# Patient Record
Sex: MALE | Race: WHITE | NOT HISPANIC OR LATINO | Employment: FULL TIME | URBAN - METROPOLITAN AREA
[De-identification: names, ages, dates, MRNs, and addresses within clinical notes are randomized per-mention and may not be internally consistent; named-entity substitution may affect disease eponyms.]

---

## 2022-02-24 ENCOUNTER — LAB VISIT (OUTPATIENT)
Dept: LAB | Facility: HOSPITAL | Age: 52
End: 2022-02-24
Attending: INTERNAL MEDICINE
Payer: COMMERCIAL

## 2022-02-24 ENCOUNTER — OFFICE VISIT (OUTPATIENT)
Dept: PRIMARY CARE CLINIC | Facility: CLINIC | Age: 52
End: 2022-02-24
Payer: COMMERCIAL

## 2022-02-24 VITALS
HEIGHT: 72 IN | SYSTOLIC BLOOD PRESSURE: 122 MMHG | OXYGEN SATURATION: 97 % | BODY MASS INDEX: 25.47 KG/M2 | WEIGHT: 188.06 LBS | RESPIRATION RATE: 16 BRPM | TEMPERATURE: 98 F | HEART RATE: 67 BPM | DIASTOLIC BLOOD PRESSURE: 78 MMHG

## 2022-02-24 DIAGNOSIS — Z12.5 PROSTATE CANCER SCREENING: ICD-10-CM

## 2022-02-24 DIAGNOSIS — Z11.4 ENCOUNTER FOR SCREENING FOR HIV: ICD-10-CM

## 2022-02-24 DIAGNOSIS — I10 HYPERTENSION, UNSPECIFIED TYPE: ICD-10-CM

## 2022-02-24 DIAGNOSIS — Z11.59 NEED FOR HEPATITIS C SCREENING TEST: ICD-10-CM

## 2022-02-24 DIAGNOSIS — Z00.00 WELL ADULT EXAM: ICD-10-CM

## 2022-02-24 DIAGNOSIS — Z11.4 ENCOUNTER FOR SCREENING FOR HIV: Primary | ICD-10-CM

## 2022-02-24 DIAGNOSIS — Z12.11 COLON CANCER SCREENING: ICD-10-CM

## 2022-02-24 LAB
ALBUMIN SERPL BCP-MCNC: 4.6 G/DL (ref 3.5–5.2)
ALP SERPL-CCNC: 54 U/L (ref 55–135)
ALT SERPL W/O P-5'-P-CCNC: 33 U/L (ref 10–44)
ANION GAP SERPL CALC-SCNC: 10 MMOL/L (ref 8–16)
AST SERPL-CCNC: 27 U/L (ref 10–40)
BASOPHILS # BLD AUTO: 0.04 K/UL (ref 0–0.2)
BASOPHILS NFR BLD: 0.9 % (ref 0–1.9)
BILIRUB SERPL-MCNC: 0.7 MG/DL (ref 0.1–1)
BUN SERPL-MCNC: 13 MG/DL (ref 6–20)
CALCIUM SERPL-MCNC: 10.5 MG/DL (ref 8.7–10.5)
CHLORIDE SERPL-SCNC: 100 MMOL/L (ref 95–110)
CHOLEST SERPL-MCNC: 236 MG/DL (ref 120–199)
CHOLEST/HDLC SERPL: 3.1 {RATIO} (ref 2–5)
CO2 SERPL-SCNC: 31 MMOL/L (ref 23–29)
COMPLEXED PSA SERPL-MCNC: 1.2 NG/ML (ref 0–4)
CREAT SERPL-MCNC: 0.8 MG/DL (ref 0.5–1.4)
DIFFERENTIAL METHOD: ABNORMAL
EOSINOPHIL # BLD AUTO: 0.2 K/UL (ref 0–0.5)
EOSINOPHIL NFR BLD: 4 % (ref 0–8)
ERYTHROCYTE [DISTWIDTH] IN BLOOD BY AUTOMATED COUNT: 11.9 % (ref 11.5–14.5)
EST. GFR  (AFRICAN AMERICAN): >60 ML/MIN/1.73 M^2
EST. GFR  (NON AFRICAN AMERICAN): >60 ML/MIN/1.73 M^2
GLUCOSE SERPL-MCNC: 112 MG/DL (ref 70–110)
HCT VFR BLD AUTO: 47.5 % (ref 40–54)
HDLC SERPL-MCNC: 77 MG/DL (ref 40–75)
HDLC SERPL: 32.6 % (ref 20–50)
HGB BLD-MCNC: 16 G/DL (ref 14–18)
IMM GRANULOCYTES # BLD AUTO: 0.01 K/UL (ref 0–0.04)
IMM GRANULOCYTES NFR BLD AUTO: 0.2 % (ref 0–0.5)
LDLC SERPL CALC-MCNC: 143 MG/DL (ref 63–159)
LYMPHOCYTES # BLD AUTO: 1.1 K/UL (ref 1–4.8)
LYMPHOCYTES NFR BLD: 24.7 % (ref 18–48)
MCH RBC QN AUTO: 31.4 PG (ref 27–31)
MCHC RBC AUTO-ENTMCNC: 33.7 G/DL (ref 32–36)
MCV RBC AUTO: 93 FL (ref 82–98)
MONOCYTES # BLD AUTO: 0.4 K/UL (ref 0.3–1)
MONOCYTES NFR BLD: 9.1 % (ref 4–15)
NEUTROPHILS # BLD AUTO: 2.6 K/UL (ref 1.8–7.7)
NEUTROPHILS NFR BLD: 61.1 % (ref 38–73)
NONHDLC SERPL-MCNC: 159 MG/DL
NRBC BLD-RTO: 0 /100 WBC
PLATELET # BLD AUTO: 251 K/UL (ref 150–450)
PMV BLD AUTO: 9.7 FL (ref 9.2–12.9)
POTASSIUM SERPL-SCNC: 4.5 MMOL/L (ref 3.5–5.1)
PROT SERPL-MCNC: 7.9 G/DL (ref 6–8.4)
RBC # BLD AUTO: 5.1 M/UL (ref 4.6–6.2)
SODIUM SERPL-SCNC: 141 MMOL/L (ref 136–145)
TRIGL SERPL-MCNC: 80 MG/DL (ref 30–150)
WBC # BLD AUTO: 4.29 K/UL (ref 3.9–12.7)

## 2022-02-24 PROCEDURE — 1159F MED LIST DOCD IN RCRD: CPT | Mod: CPTII,S$GLB,, | Performed by: INTERNAL MEDICINE

## 2022-02-24 PROCEDURE — 3074F PR MOST RECENT SYSTOLIC BLOOD PRESSURE < 130 MM HG: ICD-10-PCS | Mod: CPTII,S$GLB,, | Performed by: INTERNAL MEDICINE

## 2022-02-24 PROCEDURE — 3008F PR BODY MASS INDEX (BMI) DOCUMENTED: ICD-10-PCS | Mod: CPTII,S$GLB,, | Performed by: INTERNAL MEDICINE

## 2022-02-24 PROCEDURE — 86803 HEPATITIS C AB TEST: CPT | Performed by: INTERNAL MEDICINE

## 2022-02-24 PROCEDURE — 84153 ASSAY OF PSA TOTAL: CPT | Performed by: INTERNAL MEDICINE

## 2022-02-24 PROCEDURE — 99386 PREV VISIT NEW AGE 40-64: CPT | Mod: S$GLB,,, | Performed by: INTERNAL MEDICINE

## 2022-02-24 PROCEDURE — 3078F DIAST BP <80 MM HG: CPT | Mod: CPTII,S$GLB,, | Performed by: INTERNAL MEDICINE

## 2022-02-24 PROCEDURE — 3074F SYST BP LT 130 MM HG: CPT | Mod: CPTII,S$GLB,, | Performed by: INTERNAL MEDICINE

## 2022-02-24 PROCEDURE — 99386 PR PREVENTIVE VISIT,NEW,40-64: ICD-10-PCS | Mod: S$GLB,,, | Performed by: INTERNAL MEDICINE

## 2022-02-24 PROCEDURE — 87389 HIV-1 AG W/HIV-1&-2 AB AG IA: CPT | Performed by: INTERNAL MEDICINE

## 2022-02-24 PROCEDURE — 80061 LIPID PANEL: CPT | Performed by: INTERNAL MEDICINE

## 2022-02-24 PROCEDURE — 3008F BODY MASS INDEX DOCD: CPT | Mod: CPTII,S$GLB,, | Performed by: INTERNAL MEDICINE

## 2022-02-24 PROCEDURE — 99999 PR PBB SHADOW E&M-NEW PATIENT-LVL III: CPT | Mod: PBBFAC,,, | Performed by: INTERNAL MEDICINE

## 2022-02-24 PROCEDURE — 3078F PR MOST RECENT DIASTOLIC BLOOD PRESSURE < 80 MM HG: ICD-10-PCS | Mod: CPTII,S$GLB,, | Performed by: INTERNAL MEDICINE

## 2022-02-24 PROCEDURE — 85025 COMPLETE CBC W/AUTO DIFF WBC: CPT | Performed by: INTERNAL MEDICINE

## 2022-02-24 PROCEDURE — 1159F PR MEDICATION LIST DOCUMENTED IN MEDICAL RECORD: ICD-10-PCS | Mod: CPTII,S$GLB,, | Performed by: INTERNAL MEDICINE

## 2022-02-24 PROCEDURE — 36415 COLL VENOUS BLD VENIPUNCTURE: CPT | Mod: PN | Performed by: INTERNAL MEDICINE

## 2022-02-24 PROCEDURE — 99999 PR PBB SHADOW E&M-NEW PATIENT-LVL III: ICD-10-PCS | Mod: PBBFAC,,, | Performed by: INTERNAL MEDICINE

## 2022-02-24 PROCEDURE — 80053 COMPREHEN METABOLIC PANEL: CPT | Performed by: INTERNAL MEDICINE

## 2022-02-24 RX ORDER — ZOSTER VACCINE RECOMBINANT, ADJUVANTED 50 MCG/0.5
0.5 KIT INTRAMUSCULAR ONCE
Qty: 1 EACH | Refills: 1 | Status: SHIPPED | OUTPATIENT
Start: 2022-02-24 | End: 2022-02-24

## 2022-02-24 RX ORDER — HYDROCHLOROTHIAZIDE 50 MG/1
TABLET ORAL
COMMUNITY
End: 2022-04-11 | Stop reason: SDUPTHER

## 2022-02-24 NOTE — PROGRESS NOTES
[facility] Progress Note  2/24/2022          Reason for Visit:      is here to establish care      History of Present Illness:     Patient is here today to establish care.  He previously was seeing a physician [place] where he was traveling frequently for work.  He is not traveling there is frequently anymore wants to establish primary care here which is his home.    The patient has history of high blood pressure and has been on hydrochlorothiazide which has been controlling it well.  He denies any side effects on the medications and says that his blood pressure readings have been good whenever it has been checked.    Otherwise patient has no other past medical history.    Patient never had colon cancer screening and is agreeable to set up a screening colonoscopy.  We discussed the options and he preferred to go that route.    We did have a discussion about the pros and cons of prostate cancer screening and he want to get a PSA today.    He is agreeable to get routine blood work today.    Reviewed vaccinations.  He declined a flu shot today but he is agreeable to get the Shingrix shot which we sent to the pharmacy.  He is up-to-date otherwise.        Problem List:     Patient Active Problem List   Diagnosis    HTN (hypertension)         Surgical History:     He has a past surgical history that includes LASIK.      Family History:      His family history includes Diverticulitis in his mother; Hypertension in his father.      Social History:     He reports that he has never smoked. He has never used smokeless tobacco. He reports current alcohol use. He reports previous drug use.      Medications:       Current Outpatient Medications:     multivitamin capsule, Take 1 capsule by mouth once daily., Disp: , Rfl:     hydroCHLOROthiazide (HYDRODIURIL) 50 MG tablet, hydrochlorothiazide 50 mg tablet  TAKE 1 TABLET BY MOUTH EVERY DAY, Disp: , Rfl:     varicella-zoster gE-AS01B, PF,  (SHINGRIX, PF,) 50 mcg/0.5 mL injection, Inject 0.5 mLs into the muscle once. for 1 dose, Disp: 1 each, Rfl: 1        Allergies:   He has No Known Allergies.      Review of Systems:     Review of Systems   Constitutional: Negative for chills and fever.   HENT: Negative for ear pain and sore throat.    Respiratory: Negative for cough, shortness of breath and wheezing.    Cardiovascular: Negative for chest pain and palpitations.   Gastrointestinal: Negative for constipation, diarrhea, nausea and vomiting.   Genitourinary: Negative for dysuria and hematuria.   Musculoskeletal: Negative for joint swelling and joint deformity.   Neurological: Negative for dizziness and weakness.           Physical Exam:     Temp:    98.1 °F (36.7 °C) (Oral)        Blood Pressure:  122/78        Pulse:   67     Respirations:  16  Weight:   85.3 kg (188 lb 0.8 oz)  Height:   6' (1.829 m)  BMI:     Body mass index is 25.5 kg/m².    Physical Exam  Constitutional:       General: He is not in acute distress.  HENT:      Right Ear: Tympanic membrane normal.      Left Ear: Tympanic membrane normal.      Nose: No congestion or rhinorrhea.      Mouth/Throat:      Pharynx: No oropharyngeal exudate or posterior oropharyngeal erythema.   Cardiovascular:      Rate and Rhythm: Normal rate and regular rhythm.   Pulmonary:      Effort: Pulmonary effort is normal. No respiratory distress.      Breath sounds: No wheezing.   Abdominal:      Palpations: Abdomen is soft.      Tenderness: There is no abdominal tenderness.   Skin:     General: Skin is warm.   Neurological:      General: No focal deficit present.      Mental Status: He is alert and oriented to person, place, and time.         Assessment and Plan:     1. Well adult exam  Routine labs today  Shingrix sent to pharmacy  Schedule colonoscopy    - CBC Auto Differential; Future  - Comprehensive Metabolic Panel; Future  - Lipid Panel; Future  - PSA, Screening; Future  - HIV 1/2 Ag/Ab (4th Gen);  Future  - Hepatitis C Antibody; Future    2. Hypertension, unspecified type  Stable, continue hctz daily    3. Encounter for screening for HIV  - HIV 1/2 Ag/Ab (4th Gen); Future    4. Need for hepatitis C screening test  - Hepatitis C Antibody; Future    5. Prostate cancer screening  - PSA, Screening; Future    6. Colon cancer screening  - Case Request Endoscopy: COLONOSCOPY       RTC 12 mos or sooner noble Sanchez MD  2/24/2022    This note was prepared using voice-recognition software.  Although efforts are made to proofread the note, some errors may persist in the final document.

## 2022-02-28 LAB
HCV AB SERPL QL IA: NEGATIVE
HIV 1+2 AB+HIV1 P24 AG SERPL QL IA: NEGATIVE

## 2022-04-11 ENCOUNTER — PATIENT MESSAGE (OUTPATIENT)
Dept: PRIMARY CARE CLINIC | Facility: CLINIC | Age: 52
End: 2022-04-11
Payer: COMMERCIAL

## 2022-04-11 DIAGNOSIS — I10 HYPERTENSION, UNSPECIFIED TYPE: Primary | ICD-10-CM

## 2022-04-11 DIAGNOSIS — I10 HYPERTENSION, UNSPECIFIED TYPE: ICD-10-CM

## 2022-04-11 RX ORDER — HYDROCHLOROTHIAZIDE 50 MG/1
TABLET ORAL
Qty: 30 TABLET | Refills: 3 | Status: SHIPPED | OUTPATIENT
Start: 2022-04-11 | End: 2022-04-11 | Stop reason: SDUPTHER

## 2022-04-11 RX ORDER — HYDROCHLOROTHIAZIDE 50 MG/1
TABLET ORAL
Qty: 30 TABLET | Refills: 3 | Status: SHIPPED | OUTPATIENT
Start: 2022-04-11 | End: 2022-07-07

## 2023-03-08 ENCOUNTER — TELEPHONE (OUTPATIENT)
Dept: PRIMARY CARE CLINIC | Facility: CLINIC | Age: 53
End: 2023-03-08
Payer: COMMERCIAL

## 2023-03-08 DIAGNOSIS — I10 HTN (HYPERTENSION): ICD-10-CM

## 2023-03-08 NOTE — TELEPHONE ENCOUNTER
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 He would like to know what will be a good day and time to drop off or stop by to have paperwork filled out. Please call to advise the best way to do this. Per pt, this matter is time sensitive.                 Thank you

## 2023-03-10 ENCOUNTER — OFFICE VISIT (OUTPATIENT)
Dept: PRIMARY CARE CLINIC | Facility: CLINIC | Age: 53
End: 2023-03-10
Payer: COMMERCIAL

## 2023-03-10 ENCOUNTER — LAB VISIT (OUTPATIENT)
Dept: LAB | Facility: HOSPITAL | Age: 53
End: 2023-03-10
Attending: INTERNAL MEDICINE
Payer: COMMERCIAL

## 2023-03-10 VITALS
BODY MASS INDEX: 24.81 KG/M2 | RESPIRATION RATE: 18 BRPM | HEART RATE: 84 BPM | HEIGHT: 73 IN | SYSTOLIC BLOOD PRESSURE: 133 MMHG | OXYGEN SATURATION: 98 % | WEIGHT: 187.19 LBS | DIASTOLIC BLOOD PRESSURE: 87 MMHG | TEMPERATURE: 98 F

## 2023-03-10 DIAGNOSIS — R73.01 IFG (IMPAIRED FASTING GLUCOSE): ICD-10-CM

## 2023-03-10 DIAGNOSIS — Z12.11 COLON CANCER SCREENING: ICD-10-CM

## 2023-03-10 DIAGNOSIS — I10 HYPERTENSION, UNSPECIFIED TYPE: ICD-10-CM

## 2023-03-10 DIAGNOSIS — Z00.00 WELL ADULT EXAM: Primary | ICD-10-CM

## 2023-03-10 LAB
AMORPH CRY UR QL COMP ASSIST: ABNORMAL
BILIRUB UR QL STRIP: NEGATIVE
CLARITY UR REFRACT.AUTO: ABNORMAL
COLOR UR AUTO: YELLOW
GLUCOSE UR QL STRIP: NEGATIVE
HGB UR QL STRIP: NEGATIVE
KETONES UR QL STRIP: NEGATIVE
LEUKOCYTE ESTERASE UR QL STRIP: NEGATIVE
MICROSCOPIC COMMENT: ABNORMAL
NITRITE UR QL STRIP: NEGATIVE
PH UR STRIP: 8 [PH] (ref 5–8)
PROT UR QL STRIP: NEGATIVE
SP GR UR STRIP: 1.01 (ref 1–1.03)
URN SPEC COLLECT METH UR: ABNORMAL
YEAST UR QL AUTO: ABNORMAL

## 2023-03-10 PROCEDURE — 3008F BODY MASS INDEX DOCD: CPT | Mod: CPTII,S$GLB,, | Performed by: INTERNAL MEDICINE

## 2023-03-10 PROCEDURE — 3075F PR MOST RECENT SYSTOLIC BLOOD PRESS GE 130-139MM HG: ICD-10-PCS | Mod: CPTII,S$GLB,, | Performed by: INTERNAL MEDICINE

## 2023-03-10 PROCEDURE — 99396 PR PREVENTIVE VISIT,EST,40-64: ICD-10-PCS | Mod: S$GLB,,, | Performed by: INTERNAL MEDICINE

## 2023-03-10 PROCEDURE — 3079F PR MOST RECENT DIASTOLIC BLOOD PRESSURE 80-89 MM HG: ICD-10-PCS | Mod: CPTII,S$GLB,, | Performed by: INTERNAL MEDICINE

## 2023-03-10 PROCEDURE — 81001 URINALYSIS AUTO W/SCOPE: CPT | Performed by: INTERNAL MEDICINE

## 2023-03-10 PROCEDURE — 99396 PREV VISIT EST AGE 40-64: CPT | Mod: S$GLB,,, | Performed by: INTERNAL MEDICINE

## 2023-03-10 PROCEDURE — 99999 PR PBB SHADOW E&M-EST. PATIENT-LVL IV: CPT | Mod: PBBFAC,,, | Performed by: INTERNAL MEDICINE

## 2023-03-10 PROCEDURE — 99999 PR PBB SHADOW E&M-EST. PATIENT-LVL IV: ICD-10-PCS | Mod: PBBFAC,,, | Performed by: INTERNAL MEDICINE

## 2023-03-10 PROCEDURE — 1159F PR MEDICATION LIST DOCUMENTED IN MEDICAL RECORD: ICD-10-PCS | Mod: CPTII,S$GLB,, | Performed by: INTERNAL MEDICINE

## 2023-03-10 PROCEDURE — 1159F MED LIST DOCD IN RCRD: CPT | Mod: CPTII,S$GLB,, | Performed by: INTERNAL MEDICINE

## 2023-03-10 PROCEDURE — 3008F PR BODY MASS INDEX (BMI) DOCUMENTED: ICD-10-PCS | Mod: CPTII,S$GLB,, | Performed by: INTERNAL MEDICINE

## 2023-03-10 PROCEDURE — 3079F DIAST BP 80-89 MM HG: CPT | Mod: CPTII,S$GLB,, | Performed by: INTERNAL MEDICINE

## 2023-03-10 PROCEDURE — 3075F SYST BP GE 130 - 139MM HG: CPT | Mod: CPTII,S$GLB,, | Performed by: INTERNAL MEDICINE

## 2023-03-10 NOTE — PROGRESS NOTES
"  [facility] Progress Note  3/10/2023          Reason for Visit:      had concerns including Annual Exam.       History of Present Illness:     HTN  The patient has history of high blood pressure and has been on hydrochlorothiazide which has been controlling it well.  He denies any side effects on the medications and says that his blood pressure readings have been good whenever it has been checked.     Otherwise patient has no other past medical history.     Patient never had colon cancer screening and is agreeable to set up a screening colonoscopy.  We discussed the options and he preferred to go that route.     We did have a discussion about the pros and cons of prostate cancer screening and he want to get a PSA today.     He is agreeable to get routine blood work today.     Reviewed vaccinations.  He declined a flu shot today but he is agreeable to get the Shingrix shot which we sent to the pharmacy.  He is up-to-date otherwise.      Problem List:     Patient Active Problem List   Diagnosis    HTN (hypertension)         Medications:       Current Outpatient Medications:     hydroCHLOROthiazide (HYDRODIURIL) 50 MG tablet, Take 1 tablet (50 mg total) by mouth once daily., Disp: 90 tablet, Rfl: 1    multivitamin capsule, Take 1 capsule by mouth once daily., Disp: , Rfl:         Review of Systems:     Review of Systems        Physical Exam:     Temp:    97.5 °F (36.4 °C) (Oral)        Blood Pressure:  133/87        Pulse:   84     Respirations:  18  Weight:   84.9 kg (187 lb 2.7 oz)  Height:   6' 1" (1.854 m)  BMI:     Body mass index is 24.69 kg/m².    Physical Exam      Labs/Imaging/Testing:           Assessment and Plan:     There are no diagnoses linked to this encounter.       No follow-ups on file.       Scot Sanchez MD  3/10/2023    This note was prepared using voice-recognition software.  Although efforts are made to proofread the note, some errors may persist in the final " document.

## 2023-03-10 NOTE — PROGRESS NOTES
[facility] Progress Note  3/10/2023          Reason for Visit:      had concerns including Annual Exam.       History of Present Illness:     Pt is here today for annual exam - no new complaints.    HTN  ON HCTZ 50 mg daily  No side effects, and BP looks ok today  Pt was interested in trying to get off meds and see if bp would be ok.  Pt has not has any significant changes in weight/lifestyle since he started the meds, so we discussed there may not be any reason to suspect that bp would be better of meds, but not opposed to giving him a trial off as long as robyn monitors bp closely and sends us logs.  He says he might just continue the medication, but he does opt to do a trial off, he will send us logs.      Pt never got a colonoscopy last year, but requests we put in a referral again  Encouraged bivalent covid booster and shingles vaccine at [facility]    Problem List:     Patient Active Problem List   Diagnosis    HTN (hypertension)    IFG (impaired fasting glucose)         Surgical History:     He has a past surgical history that includes LASIK.      Family History:      His family history includes Diverticulitis in his mother; Hypertension in his father.      Social History:     He reports that he has never smoked. He has never used smokeless tobacco. He reports current alcohol use. He reports that he does not currently use drugs.      Medications:       Current Outpatient Medications:     hydroCHLOROthiazide (HYDRODIURIL) 50 MG tablet, Take 1 tablet (50 mg total) by mouth once daily., Disp: 90 tablet, Rfl: 1    multivitamin capsule, Take 1 capsule by mouth once daily., Disp: , Rfl:         Allergies:   He has No Known Allergies.      Review of Systems:     Review of Systems   Constitutional:  Negative for chills and fever.   HENT:  Negative for ear pain and sore throat.    Respiratory:  Negative for cough, shortness of breath and wheezing.    Cardiovascular:  Negative for chest pain and  "palpitations.   Gastrointestinal:  Negative for constipation, diarrhea, nausea and vomiting.   Genitourinary:  Negative for dysuria and hematuria.   Musculoskeletal:  Negative for joint swelling and joint deformity.   Neurological:  Negative for dizziness and weakness.         Physical Exam:     Temp:    97.5 °F (36.4 °C) (Oral)        Blood Pressure:  133/87        Pulse:   84     Respirations:  18  Weight:   84.9 kg (187 lb 2.7 oz)  Height:   6' 1" (1.854 m)  BMI:     Body mass index is 24.69 kg/m².    Physical Exam  Constitutional:       General: He is not in acute distress.  HENT:      Right Ear: Tympanic membrane normal.      Left Ear: Tympanic membrane normal.      Nose: No congestion or rhinorrhea.      Mouth/Throat:      Pharynx: No oropharyngeal exudate or posterior oropharyngeal erythema.   Cardiovascular:      Rate and Rhythm: Normal rate and regular rhythm.   Pulmonary:      Effort: Pulmonary effort is normal. No respiratory distress.      Breath sounds: No wheezing.   Abdominal:      Palpations: Abdomen is soft.      Tenderness: There is no abdominal tenderness.   Skin:     General: Skin is warm.   Neurological:      General: No focal deficit present.      Mental Status: He is alert and oriented to person, place, and time.         Labs/Imaging/Testing:     Most recent CBC:  Lab Results   Component Value Date    WBC 4.29 02/24/2022    HGB 16.0 02/24/2022     02/24/2022    MCV 93 02/24/2022       Most recent Lipids:  Lab Results   Component Value Date    CHOL 236 (H) 02/24/2022    HDL 77 (H) 02/24/2022    LDLCALC 143.0 02/24/2022    TRIG 80 02/24/2022       Most recent Chemistry:  Lab Results   Component Value Date     02/24/2022    K 4.5 02/24/2022     02/24/2022    CO2 31 (H) 02/24/2022    BUN 13 02/24/2022    CREATININE 0.8 02/24/2022     (H) 02/24/2022    CALCIUM 10.5 02/24/2022    ALT 33 02/24/2022    AST 27 02/24/2022    ALKPHOS 54 (L) 02/24/2022    PROT 7.9 02/24/2022    " ALBUMIN 4.6 02/24/2022           Assessment and Plan:     1. Well adult exam  - CBC Auto Differential; Future  - Comprehensive Metabolic Panel; Future  - Lipid Panel; Future  - Hemoglobin A1C; Future  - TSH; Future    2. IFG (impaired fasting glucose)  Recheck labs including a1c  Discussed low carb diet which is pretty much already doing.    3. Hypertension, unspecified type  - Urinalysis; Future    4. Colon cancer screening  - Ambulatory referral/consult to Endo Procedure ; Future     RTC 12 mos or sooner noble Sanchez MD  3/10/2023    This note was prepared using voice-recognition software.  Although efforts are made to proofread the note, some errors may persist in the final document.

## 2023-03-13 ENCOUNTER — LAB VISIT (OUTPATIENT)
Dept: LAB | Facility: HOSPITAL | Age: 53
End: 2023-03-13
Attending: INTERNAL MEDICINE
Payer: COMMERCIAL

## 2023-03-13 ENCOUNTER — PATIENT MESSAGE (OUTPATIENT)
Dept: ADMINISTRATIVE | Facility: HOSPITAL | Age: 53
End: 2023-03-13
Payer: COMMERCIAL

## 2023-03-13 DIAGNOSIS — Z00.00 WELL ADULT EXAM: ICD-10-CM

## 2023-03-13 DIAGNOSIS — R73.01 IFG (IMPAIRED FASTING GLUCOSE): ICD-10-CM

## 2023-03-13 DIAGNOSIS — I10 HTN (HYPERTENSION): ICD-10-CM

## 2023-03-13 LAB
ALBUMIN SERPL BCP-MCNC: 4.3 G/DL (ref 3.5–5.2)
ALBUMIN SERPL BCP-MCNC: 4.3 G/DL (ref 3.5–5.2)
ALP SERPL-CCNC: 46 U/L (ref 55–135)
ALP SERPL-CCNC: 46 U/L (ref 55–135)
ALT SERPL W/O P-5'-P-CCNC: 35 U/L (ref 10–44)
ALT SERPL W/O P-5'-P-CCNC: 35 U/L (ref 10–44)
ANION GAP SERPL CALC-SCNC: 14 MMOL/L (ref 8–16)
ANION GAP SERPL CALC-SCNC: 14 MMOL/L (ref 8–16)
AST SERPL-CCNC: 29 U/L (ref 10–40)
AST SERPL-CCNC: 29 U/L (ref 10–40)
BASOPHILS # BLD AUTO: 0.03 K/UL (ref 0–0.2)
BASOPHILS NFR BLD: 0.6 % (ref 0–1.9)
BILIRUB SERPL-MCNC: 0.7 MG/DL (ref 0.1–1)
BILIRUB SERPL-MCNC: 0.7 MG/DL (ref 0.1–1)
BUN SERPL-MCNC: 12 MG/DL (ref 6–20)
BUN SERPL-MCNC: 12 MG/DL (ref 6–20)
CALCIUM SERPL-MCNC: 10 MG/DL (ref 8.7–10.5)
CALCIUM SERPL-MCNC: 10 MG/DL (ref 8.7–10.5)
CHLORIDE SERPL-SCNC: 100 MMOL/L (ref 95–110)
CHLORIDE SERPL-SCNC: 100 MMOL/L (ref 95–110)
CHOLEST SERPL-MCNC: 220 MG/DL (ref 120–199)
CHOLEST/HDLC SERPL: 3.5 {RATIO} (ref 2–5)
CO2 SERPL-SCNC: 26 MMOL/L (ref 23–29)
CO2 SERPL-SCNC: 26 MMOL/L (ref 23–29)
CREAT SERPL-MCNC: 0.9 MG/DL (ref 0.5–1.4)
CREAT SERPL-MCNC: 0.9 MG/DL (ref 0.5–1.4)
DIFFERENTIAL METHOD: NORMAL
EOSINOPHIL # BLD AUTO: 0.3 K/UL (ref 0–0.5)
EOSINOPHIL NFR BLD: 5.5 % (ref 0–8)
ERYTHROCYTE [DISTWIDTH] IN BLOOD BY AUTOMATED COUNT: 11.9 % (ref 11.5–14.5)
EST. GFR  (NO RACE VARIABLE): >60 ML/MIN/1.73 M^2
EST. GFR  (NO RACE VARIABLE): >60 ML/MIN/1.73 M^2
ESTIMATED AVG GLUCOSE: 105 MG/DL (ref 68–131)
GLUCOSE SERPL-MCNC: 103 MG/DL (ref 70–110)
GLUCOSE SERPL-MCNC: 103 MG/DL (ref 70–110)
HBA1C MFR BLD: 5.3 % (ref 4–5.6)
HCT VFR BLD AUTO: 45.2 % (ref 40–54)
HDLC SERPL-MCNC: 63 MG/DL (ref 40–75)
HDLC SERPL: 28.6 % (ref 20–50)
HGB BLD-MCNC: 15 G/DL (ref 14–18)
IMM GRANULOCYTES # BLD AUTO: 0.01 K/UL (ref 0–0.04)
IMM GRANULOCYTES NFR BLD AUTO: 0.2 % (ref 0–0.5)
LDLC SERPL CALC-MCNC: 132.4 MG/DL (ref 63–159)
LYMPHOCYTES # BLD AUTO: 1.6 K/UL (ref 1–4.8)
LYMPHOCYTES NFR BLD: 32.9 % (ref 18–48)
MCH RBC QN AUTO: 30.5 PG (ref 27–31)
MCHC RBC AUTO-ENTMCNC: 33.2 G/DL (ref 32–36)
MCV RBC AUTO: 92 FL (ref 82–98)
MONOCYTES # BLD AUTO: 0.5 K/UL (ref 0.3–1)
MONOCYTES NFR BLD: 9.6 % (ref 4–15)
NEUTROPHILS # BLD AUTO: 2.4 K/UL (ref 1.8–7.7)
NEUTROPHILS NFR BLD: 51.2 % (ref 38–73)
NONHDLC SERPL-MCNC: 157 MG/DL
NRBC BLD-RTO: 0 /100 WBC
PLATELET # BLD AUTO: 248 K/UL (ref 150–450)
PMV BLD AUTO: 9.6 FL (ref 9.2–12.9)
POTASSIUM SERPL-SCNC: 4.7 MMOL/L (ref 3.5–5.1)
POTASSIUM SERPL-SCNC: 4.7 MMOL/L (ref 3.5–5.1)
PROT SERPL-MCNC: 7.3 G/DL (ref 6–8.4)
PROT SERPL-MCNC: 7.3 G/DL (ref 6–8.4)
RBC # BLD AUTO: 4.92 M/UL (ref 4.6–6.2)
SODIUM SERPL-SCNC: 140 MMOL/L (ref 136–145)
SODIUM SERPL-SCNC: 140 MMOL/L (ref 136–145)
TRIGL SERPL-MCNC: 123 MG/DL (ref 30–150)
TSH SERPL DL<=0.005 MIU/L-ACNC: 2.93 UIU/ML (ref 0.4–4)
WBC # BLD AUTO: 4.71 K/UL (ref 3.9–12.7)

## 2023-03-13 PROCEDURE — 85025 COMPLETE CBC W/AUTO DIFF WBC: CPT | Performed by: INTERNAL MEDICINE

## 2023-03-13 PROCEDURE — 83036 HEMOGLOBIN GLYCOSYLATED A1C: CPT | Performed by: INTERNAL MEDICINE

## 2023-03-13 PROCEDURE — 36415 COLL VENOUS BLD VENIPUNCTURE: CPT | Mod: PN | Performed by: INTERNAL MEDICINE

## 2023-03-13 PROCEDURE — 84443 ASSAY THYROID STIM HORMONE: CPT | Performed by: INTERNAL MEDICINE

## 2023-03-13 PROCEDURE — 80061 LIPID PANEL: CPT | Performed by: INTERNAL MEDICINE

## 2023-03-13 PROCEDURE — 80053 COMPREHEN METABOLIC PANEL: CPT | Performed by: INTERNAL MEDICINE

## 2023-03-21 ENCOUNTER — PATIENT OUTREACH (OUTPATIENT)
Dept: ADMINISTRATIVE | Facility: HOSPITAL | Age: 53
End: 2023-03-21
Payer: COMMERCIAL

## 2023-04-03 DIAGNOSIS — I10 HYPERTENSION, UNSPECIFIED TYPE: ICD-10-CM

## 2023-04-03 RX ORDER — HYDROCHLOROTHIAZIDE 50 MG/1
50 TABLET ORAL DAILY
Qty: 90 TABLET | Refills: 1 | Status: CANCELLED | OUTPATIENT
Start: 2023-04-03

## 2023-04-03 NOTE — TELEPHONE ENCOUNTER
No new care gaps identified.  [facility] Embedded Care Gaps. Reference number: [id number]. 4/03/2023   4:34:56 PM CDT   yes

## 2023-04-05 ENCOUNTER — TELEPHONE (OUTPATIENT)
Dept: PRIMARY CARE CLINIC | Facility: CLINIC | Age: 53
End: 2023-04-05
Payer: COMMERCIAL

## 2023-04-05 DIAGNOSIS — I10 HYPERTENSION, UNSPECIFIED TYPE: ICD-10-CM

## 2023-04-05 RX ORDER — HYDROCHLOROTHIAZIDE 50 MG/1
50 TABLET ORAL DAILY
Qty: 90 TABLET | Refills: 1 | Status: SHIPPED | OUTPATIENT
Start: 2023-04-05 | End: 2023-06-28 | Stop reason: SDUPTHER

## 2023-04-05 NOTE — TELEPHONE ENCOUNTER
----- Message from Sissy Simpson sent at 4/5/2023 12:26 PM CDT -----  Contact: [phone]  Pharmacy is telling pt they did not get authorization for HCTZ sent 3/24/23. Can you please resend for him and call him when completed?       [id number] [place] - [address]  [address]  [facility] [place] [address]  Phone: [phone] Fax: [phone]

## 2023-04-21 ENCOUNTER — PATIENT MESSAGE (OUTPATIENT)
Dept: ADMINISTRATIVE | Facility: HOSPITAL | Age: 53
End: 2023-04-21
Payer: COMMERCIAL

## 2023-06-28 DIAGNOSIS — I10 HYPERTENSION, UNSPECIFIED TYPE: ICD-10-CM

## 2023-06-28 RX ORDER — HYDROCHLOROTHIAZIDE 50 MG/1
50 TABLET ORAL DAILY
Qty: 90 TABLET | Refills: 1 | Status: SHIPPED | OUTPATIENT
Start: 2023-06-28

## 2023-06-28 NOTE — TELEPHONE ENCOUNTER
No care due was identified.  [facility] Embedded Care Due Messages. Reference number: [id number].   6/28/2023 11:25:07 AM CDT

## 2023-08-02 NOTE — PROGRESS NOTES
[facility] Progress Note  8/3/2023          Reason for Visit:      had concerns including Dizziness.       History of Present Illness:     Pt has a history of having headaches precipitated by head movement.  Sudden head movement would make headache pretty bad.  Would knock him out for rest of the day/ or at least several hours.  Happened about 4-5 times in the mid-40s, but he became aware and tried to avoid bending over, bobbing head too much and that seemed to prevent it.    Recently went for a run in the morning 2 months ago,   Pt was ducking under the trees on the path at [facility]  Pt got a headache on the way back.    2 weeks ago was on the run (has changed route to avoid the trees), turned head to look for traffice.  Was starting to get a similar sensation, was afraid he was going to get a headache  But he never got a headache, instead he felt dizzy and when he lied down in bed he got immediate vertigo sensation- spinning/dizziness.  Lasted about 1 1/2 days.  Certain movements of head would precipitate it.  These symptoms have since resolved.  He felt a little off for a week or so, but now is back to baseline normal.    Problem List:     Patient Active Problem List   Diagnosis    HTN (hypertension)    IFG (impaired fasting glucose)         Medications:       Current Outpatient Medications:     hydroCHLOROthiazide (HYDRODIURIL) 50 MG tablet, Take 1 tablet (50 mg total) by mouth once daily., Disp: 90 tablet, Rfl: 1    multivitamin capsule, Take 1 capsule by mouth once daily., Disp: , Rfl:         Review of Systems:     Review of Systems   Constitutional:  Negative for activity change and unexpected weight change.   HENT:  Negative for hearing loss, rhinorrhea and trouble swallowing.    Eyes:  Negative for discharge and visual disturbance.   Respiratory:  Negative for chest tightness and wheezing.    Cardiovascular:  Negative for chest pain and palpitations.   Gastrointestinal:  Negative for  "blood in stool, constipation, diarrhea and vomiting.   Endocrine: Negative for polydipsia and polyuria.   Genitourinary:  Negative for difficulty urinating, hematuria and urgency.   Musculoskeletal:  Negative for arthralgias, joint swelling and neck pain.   Neurological:  Positive for headaches. Negative for weakness.   Psychiatric/Behavioral:  Negative for confusion and dysphoric mood.            Physical Exam:     Temp:             Blood Pressure:  128/84        Pulse:   83     Respirations:  18  Weight:   86.7 kg (191 lb 2.2 oz)  Height:   6' 1" (1.854 m)  BMI:     Body mass index is 25.22 kg/m².    Physical Exam  Constitutional:       General: He is not in acute distress.  Cardiovascular:      Rate and Rhythm: Normal rate and regular rhythm.   Pulmonary:      Effort: Pulmonary effort is normal. No respiratory distress.      Breath sounds: No wheezing.   Skin:     General: Skin is warm.   Neurological:      General: No focal deficit present.      Mental Status: He is alert and oriented to person, place, and time.           Assessment and Plan:     1. Benign paroxysmal positional vertigo, unspecified laterality  I don't know if the headaches are related and this most recent episode sounds like BPPV, which his mother also has.  Gave handout for particle repositioning exercises he can try if he ever has recurrent.  Also discussed using meclizine prn   Follow up if any recurrence.       Scot Sanchez MD  8/3/2023    This note was prepared using voice-recognition software.  Although efforts are made to proofread the note, some errors may persist in the final document.    "

## 2023-08-03 ENCOUNTER — OFFICE VISIT (OUTPATIENT)
Dept: PRIMARY CARE CLINIC | Facility: CLINIC | Age: 53
End: 2023-08-03
Payer: COMMERCIAL

## 2023-08-03 VITALS
SYSTOLIC BLOOD PRESSURE: 128 MMHG | BODY MASS INDEX: 25.33 KG/M2 | HEART RATE: 83 BPM | DIASTOLIC BLOOD PRESSURE: 84 MMHG | HEIGHT: 73 IN | RESPIRATION RATE: 18 BRPM | OXYGEN SATURATION: 98 % | WEIGHT: 191.13 LBS

## 2023-08-03 DIAGNOSIS — H81.10 BENIGN PAROXYSMAL POSITIONAL VERTIGO, UNSPECIFIED LATERALITY: Primary | ICD-10-CM

## 2023-08-03 PROCEDURE — 99999 PR PBB SHADOW E&M-EST. PATIENT-LVL III: ICD-10-PCS | Mod: PBBFAC,,, | Performed by: INTERNAL MEDICINE

## 2023-08-03 PROCEDURE — 3044F HG A1C LEVEL LT 7.0%: CPT | Mod: CPTII,S$GLB,, | Performed by: INTERNAL MEDICINE

## 2023-08-03 PROCEDURE — 3008F PR BODY MASS INDEX (BMI) DOCUMENTED: ICD-10-PCS | Mod: CPTII,S$GLB,, | Performed by: INTERNAL MEDICINE

## 2023-08-03 PROCEDURE — 3008F BODY MASS INDEX DOCD: CPT | Mod: CPTII,S$GLB,, | Performed by: INTERNAL MEDICINE

## 2023-08-03 PROCEDURE — 3079F DIAST BP 80-89 MM HG: CPT | Mod: CPTII,S$GLB,, | Performed by: INTERNAL MEDICINE

## 2023-08-03 PROCEDURE — 1159F MED LIST DOCD IN RCRD: CPT | Mod: CPTII,S$GLB,, | Performed by: INTERNAL MEDICINE

## 2023-08-03 PROCEDURE — 3074F PR MOST RECENT SYSTOLIC BLOOD PRESSURE < 130 MM HG: ICD-10-PCS | Mod: CPTII,S$GLB,, | Performed by: INTERNAL MEDICINE

## 2023-08-03 PROCEDURE — 99999 PR PBB SHADOW E&M-EST. PATIENT-LVL III: CPT | Mod: PBBFAC,,, | Performed by: INTERNAL MEDICINE

## 2023-08-03 PROCEDURE — 1159F PR MEDICATION LIST DOCUMENTED IN MEDICAL RECORD: ICD-10-PCS | Mod: CPTII,S$GLB,, | Performed by: INTERNAL MEDICINE

## 2023-08-03 PROCEDURE — 3044F PR MOST RECENT HEMOGLOBIN A1C LEVEL <7.0%: ICD-10-PCS | Mod: CPTII,S$GLB,, | Performed by: INTERNAL MEDICINE

## 2023-08-03 PROCEDURE — 99214 OFFICE O/P EST MOD 30 MIN: CPT | Mod: S$GLB,,, | Performed by: INTERNAL MEDICINE

## 2023-08-03 PROCEDURE — 99214 PR OFFICE/OUTPT VISIT, EST, LEVL IV, 30-39 MIN: ICD-10-PCS | Mod: S$GLB,,, | Performed by: INTERNAL MEDICINE

## 2023-08-03 PROCEDURE — 3079F PR MOST RECENT DIASTOLIC BLOOD PRESSURE 80-89 MM HG: ICD-10-PCS | Mod: CPTII,S$GLB,, | Performed by: INTERNAL MEDICINE

## 2023-08-03 PROCEDURE — 3074F SYST BP LT 130 MM HG: CPT | Mod: CPTII,S$GLB,, | Performed by: INTERNAL MEDICINE

## 2023-08-03 NOTE — PATIENT INSTRUCTIONS
Vestibular Exercises   About this topic   A condition called benign paroxysmal positional vertigo, or BPPV, can cause dizziness. Inside of your inner ear, you have fluid. Small crystals float in the fluid. With this problem, the crystals get stuck in the inner ear. When you move your head, you may have more signs. Sometimes, special exercises can help move the crystals and make this problem better. Always ask your doctor before you do any exercises. Your doctor or a physical therapist can help you find the best exercises for your problem.  General   Before starting with a program, ask your doctor if you are healthy enough to do these exercises. Your doctor may have you work with a  or physical therapist to make a safe exercise program to meet your needs. Some of these exercises could make your problems worse at first. Ask your doctor or physical therapist how often and how long you should do these exercises. These are often done a few times each day until 2 days after the dizziness has ended.  Epley Maneuver   This is mostly used when there is a diagnosis of a particular side having a problem.  If your right side has a problem:  Have a pillow towards the end of the bed where your feet most often are. Sit with your left side closest to the side of the bed. Sit far enough away from the pillow that it will end up being under your shoulders when you lie back. Tilt your head slightly back while doing this exercise. Be sure to hold each position for 30 seconds before moving to the next position.  Start sitting up with your legs extended in front of you. Turn your head senior living between looking straight forward and looking over your right shoulder. This is a 45 degree angle. Hold this position.  Lie back quickly with your head in the same position. Turn your head senior living to the right and tilted back a little bit. Hold this position.  Now roll your head to the left. Your head should now be tilted back a little bit.  Turn your head to the left, looking snf between straight ahead and over your left shoulder. This is a 45 degree angle. Hold this position.  Now, roll onto your left shoulder. Point your head down to the left at a 45 degree angle. Hold this position.  Finish by sitting up on the side of the bed. Do this 3 times throughout the day.  If your left side has a problem:  Have a pillow towards the end of the bed where your feet most often are. Sit with your right side closest to the side of the bed. Sit far enough away from the pillow that it will end up being under your shoulders when you lie back. Tilt your head slightly back while doing this exercise. Be sure to hold each position for 30 seconds before moving to the next position.  Start sitting up with your legs extended in front of you. Turn your head snf between looking straight forward and looking over your left shoulder. This is a 45 degree angle. Hold this position.  Lie back quickly with your head in the same position. Turn your head snf to the left and tilted back a little bit. Hold this position.  Now, roll your head to the right. Tilt your head back a little bit. Turn your head to the right looking snf between straight ahead and over your right shoulder. This is a 45 degree angle. Hold this position.  Now, roll onto your right shoulder. Point your head down to the right at a 45 degree angle. Hold this position.  Finish by sitting up on the side of the bed. Do this 3 times throughout the day.  Kim-Daroff Exercises   This is mostly used when there is not a diagnosis of a particular side having a problem.  Sit at the edge of the bed in the middle of one side.  Now, lie down on your right side with your head angled upwards 45 degrees. This is looking snf between straight ahead and over your right shoulder. Hold this position for 30 seconds or until the dizziness ends.  Sit back up with your head straight and hold that position for 30  seconds.  Now, lie down on your left side with your head angled upwards 45 degrees. This is looking detention between straight ahead and over your left shoulder. Hold this position for 30 seconds or until the dizziness ends.  Sit back up with your head straight and hold that position for 30 seconds. Repeat these steps 5 times in a row, 3 times each day.     What problems could happen?   Your dizziness does not go away or gets worse  You have a different kind of dizziness  You have more nausea  Sudden change in hearing  New onset of ear pain  New onset of ringing in the ears  Fluid discharge from the ears  Where can I learn more?   [employer]  http://www.brainandspine.org.uk/bgaljcilag-tzwldpsccelqdw-fgvvsntkl   Last Reviewed Date   2018-03-27  Consumer Information Use and Disclaimer   This information is not specific medical advice and does not replace information you receive from your health care provider. This is only a brief summary of general information. It does NOT include all information about conditions, illnesses, injuries, tests, procedures, treatments, therapies, discharge instructions or life-style choices that may apply to you. You must talk with your health care provider for complete information about your health and treatment options. This information should not be used to decide whether or not to accept your health care providers advice, instructions or recommendations. Only your health care provider has the knowledge and training to provide advice that is right for you.  Copyright   Copyright © 2021 [employer] and its affiliates and/or licensors. All rights reserved.

## 2023-08-10 ENCOUNTER — TELEPHONE (OUTPATIENT)
Dept: ENDOSCOPY | Facility: HOSPITAL | Age: 53
End: 2023-08-10
Payer: COMMERCIAL

## 2023-08-10 ENCOUNTER — PATIENT MESSAGE (OUTPATIENT)
Dept: ENDOSCOPY | Facility: HOSPITAL | Age: 53
End: 2023-08-10
Payer: COMMERCIAL

## 2023-08-10 DIAGNOSIS — Z12.11 COLON CANCER SCREENING: Primary | ICD-10-CM

## 2023-08-10 RX ORDER — SODIUM, POTASSIUM,MAG SULFATES 17.5-3.13G
1 SOLUTION, RECONSTITUTED, ORAL ORAL DAILY
Qty: 1 KIT | Refills: 0 | Status: SHIPPED | OUTPATIENT
Start: 2023-08-10 | End: 2023-08-12

## 2023-08-10 NOTE — TELEPHONE ENCOUNTER
Spoke to pt to schedule procedure(s) Colonoscopy       Physician to perform procedure(s) Dr. JOSE Morales  Date of Procedure (s) 09/22/2023  Arrival Time 06:30 AM  Time of Procedure(s) 07:30 AM   Location of Procedure(s) [facility]  Type of Rx Prep sent to patient: Suprep  Instructions provided to [facility] via [facility]    Patient was informed on the following information and verbalized understanding. Screening questionnaire reviewed with patient and complete. If procedure requires anesthesia, a responsible adult needs to be present to accompany the patient home, patient cannot drive after receiving anesthesia. Appointment details are tentative, especially check-in time. Patient will receive a prep-op call 4 days prior to confirm check-in time for procedure. If applicable the patient should contact their pharmacy to verify Rx for procedure prep is ready for pick-up. Patient was advised to call the scheduling department at [phone] if pharmacy states no Rx is available. Patient was advised to call the endoscopy scheduling department if any questions or concerns arise.      [facility] Scheduling Department         Procedure: Ambulatory referral/consult to Endo Procedure  Status: Needs Scheduling (Sent to Patient)     Requested appt date: 3/11/2023 Authorizing: Scot Sanchez MD in [facility]     Referral: [id number] (Authorized)         Expires: 9/10/2023 Priority: Routine     Assign to: ELAYNE COATES       Diagnosis: Colon cancer screening [Z12.11]      Order Specific Questions     What procedure is to be performed?     Screening Colonoscopy          CPT Code:     COLON CA SCRN NOT HI RSK IND []

## 2023-09-19 ENCOUNTER — ANESTHESIA EVENT (OUTPATIENT)
Dept: ENDOSCOPY | Facility: HOSPITAL | Age: 53
End: 2023-09-19
Payer: COMMERCIAL

## 2023-09-19 NOTE — ANESTHESIA PREPROCEDURE EVALUATION
09/19/2023  Marlon Farr is a 53 y.o., male for screening colonscopy       Patient Active Problem List   Diagnosis    HTN (hypertension)    IFG (impaired fasting glucose)       No past medical history on file.    ECHO: No results found for this or any previous visit.      There is no height or weight on file to calculate BMI.    Tobacco Use: Low Risk  (8/3/2023)    Patient History     Smoking Tobacco Use: Never     Smokeless Tobacco Use: Never     Passive Exposure: Not on file       Social History     Substance and Sexual Activity   Drug Use Not Currently        Alcohol Use: Not on file       Review of patient's allergies indicates:  No Known Allergies      Airway:  No value filed.   Pre-op Assessment    I have reviewed the Patient Summary Reports.     I have reviewed the Nursing Notes. I have reviewed the NPO Status.   I have reviewed the Medications.     Review of Systems  Anesthesia Hx:  No problems with previous Anesthesia  History of prior surgery of interest to airway management or planning: Denies Family Hx of Anesthesia complications.   Denies Personal Hx of Anesthesia complications.   Hematology/Oncology:  Hematology Normal   Oncology Normal     EENT/Dental:EENT/Dental Normal   Cardiovascular:   Hypertension    Pulmonary:  Pulmonary Normal    Renal/:  Renal/ Normal     Hepatic/GI:  Hepatic/GI Normal    Musculoskeletal:  Musculoskeletal Normal    Neurological:  Neurology Normal    Endocrine:  Endocrine Normal    Dermatological:  Skin Normal    Psych:  Psychiatric Normal              Anesthesia Plan  Type of Anesthesia, risks & benefits discussed:    Anesthesia Type: Gen Natural Airway  Intra-op Monitoring Plan: Standard ASA Monitors  Post Op Pain Control Plan: multimodal analgesia and IV/PO Opioids PRN  Induction:  IV  Informed Consent: Informed consent signed with the Patient and all parties  understand the risks and agree with anesthesia plan.  All questions answered. Patient consented to blood products? No  ASA Score: 2  Day of Surgery Review of History & Physical: H&P Update referred to the surgeon/provider.    Ready For Surgery From Anesthesia Perspective.     .

## 2023-09-21 ENCOUNTER — PATIENT MESSAGE (OUTPATIENT)
Dept: ENDOSCOPY | Facility: HOSPITAL | Age: 53
End: 2023-09-21
Payer: COMMERCIAL

## 2023-09-22 ENCOUNTER — HOSPITAL ENCOUNTER (OUTPATIENT)
Facility: HOSPITAL | Age: 53
Discharge: HOME | End: 2023-09-22
Attending: INTERNAL MEDICINE | Admitting: INTERNAL MEDICINE
Payer: COMMERCIAL

## 2023-09-22 ENCOUNTER — ANESTHESIA (OUTPATIENT)
Dept: ENDOSCOPY | Facility: HOSPITAL | Age: 53
End: 2023-09-22
Payer: COMMERCIAL

## 2023-09-22 VITALS
RESPIRATION RATE: 20 BRPM | HEART RATE: 78 BPM | DIASTOLIC BLOOD PRESSURE: 97 MMHG | BODY MASS INDEX: 25.31 KG/M2 | WEIGHT: 191 LBS | TEMPERATURE: 99 F | HEIGHT: 73 IN | OXYGEN SATURATION: 98 % | SYSTOLIC BLOOD PRESSURE: 152 MMHG

## 2023-09-22 DIAGNOSIS — Z12.11 COLON CANCER SCREENING: Primary | ICD-10-CM

## 2023-09-22 PROCEDURE — 99900035 HC TECH TIME PER 15 MIN (STAT)

## 2023-09-22 PROCEDURE — G0121 COLON CA SCRN NOT HI RSK IND: HCPCS | Performed by: INTERNAL MEDICINE

## 2023-09-22 PROCEDURE — G0121 COLON CA SCRN NOT HI RSK IND: HCPCS | Mod: ,,, | Performed by: INTERNAL MEDICINE

## 2023-09-22 PROCEDURE — D9220A PRA ANESTHESIA: ICD-10-PCS | Mod: ,,, | Performed by: REGISTERED NURSE

## 2023-09-22 PROCEDURE — D9220A PRA ANESTHESIA: Mod: ,,, | Performed by: REGISTERED NURSE

## 2023-09-22 PROCEDURE — 37000008 HC ANESTHESIA 1ST 15 MINUTES: Performed by: INTERNAL MEDICINE

## 2023-09-22 PROCEDURE — 25000003 PHARM REV CODE 250: Performed by: REGISTERED NURSE

## 2023-09-22 PROCEDURE — 63600175 PHARM REV CODE 636 W HCPCS: Performed by: REGISTERED NURSE

## 2023-09-22 PROCEDURE — 94761 N-INVAS EAR/PLS OXIMETRY MLT: CPT

## 2023-09-22 PROCEDURE — G0121 COLON CA SCRN NOT HI RSK IND: ICD-10-PCS | Mod: ,,, | Performed by: INTERNAL MEDICINE

## 2023-09-22 PROCEDURE — 25000003 PHARM REV CODE 250: Performed by: INTERNAL MEDICINE

## 2023-09-22 PROCEDURE — 37000009 HC ANESTHESIA EA ADD 15 MINS: Performed by: INTERNAL MEDICINE

## 2023-09-22 RX ORDER — PROPOFOL 10 MG/ML
VIAL (ML) INTRAVENOUS CONTINUOUS PRN
Status: DISCONTINUED | OUTPATIENT
Start: 2023-09-22 | End: 2023-09-22

## 2023-09-22 RX ORDER — FENTANYL CITRATE 50 UG/ML
25 INJECTION, SOLUTION INTRAMUSCULAR; INTRAVENOUS EVERY 5 MIN PRN
Status: DISCONTINUED | OUTPATIENT
Start: 2023-09-22 | End: 2023-09-22 | Stop reason: HOSPADM

## 2023-09-22 RX ORDER — ONDANSETRON 2 MG/ML
4 INJECTION INTRAMUSCULAR; INTRAVENOUS DAILY PRN
Status: DISCONTINUED | OUTPATIENT
Start: 2023-09-22 | End: 2023-09-22 | Stop reason: HOSPADM

## 2023-09-22 RX ORDER — LIDOCAINE HYDROCHLORIDE 20 MG/ML
INJECTION INTRAVENOUS
Status: DISCONTINUED | OUTPATIENT
Start: 2023-09-22 | End: 2023-09-22

## 2023-09-22 RX ORDER — SODIUM CHLORIDE 9 MG/ML
INJECTION, SOLUTION INTRAVENOUS CONTINUOUS
Status: DISCONTINUED | OUTPATIENT
Start: 2023-09-22 | End: 2023-09-22 | Stop reason: HOSPADM

## 2023-09-22 RX ORDER — KETOROLAC TROMETHAMINE 30 MG/ML
15 INJECTION, SOLUTION INTRAMUSCULAR; INTRAVENOUS EVERY 8 HOURS PRN
Status: DISCONTINUED | OUTPATIENT
Start: 2023-09-22 | End: 2023-09-22 | Stop reason: HOSPADM

## 2023-09-22 RX ORDER — PROPOFOL 10 MG/ML
VIAL (ML) INTRAVENOUS
Status: DISCONTINUED | OUTPATIENT
Start: 2023-09-22 | End: 2023-09-22

## 2023-09-22 RX ORDER — PROCHLORPERAZINE EDISYLATE 5 MG/ML
5 INJECTION INTRAMUSCULAR; INTRAVENOUS EVERY 30 MIN PRN
Status: DISCONTINUED | OUTPATIENT
Start: 2023-09-22 | End: 2023-09-22 | Stop reason: HOSPADM

## 2023-09-22 RX ADMIN — SODIUM CHLORIDE: 0.9 INJECTION, SOLUTION INTRAVENOUS at 07:09

## 2023-09-22 RX ADMIN — Medication 175 MCG/KG/MIN: at 08:09

## 2023-09-22 RX ADMIN — PROPOFOL 100 MG: 10 INJECTION, EMULSION INTRAVENOUS at 08:09

## 2023-09-22 RX ADMIN — LIDOCAINE HYDROCHLORIDE 100 MG: 20 INJECTION INTRAVENOUS at 08:09

## 2023-09-22 NOTE — PROVATION PATIENT INSTRUCTIONS
Discharge Summary/Instructions after an Endoscopic Procedure  Patient Name: Marlon Farr  Patient MRN: [id number]  Patient YOB: 1970  Friday, September 22, 2023  Angelo Morales MD  Dear patient,  As a result of recent federal legislation (The Federal Cures Act), you may   receive lab or pathology results from your procedure in your [facility]   account before your physician is able to contact you. Your physician or   their representative will relay the results to you with their   recommendations at their soonest availability.  Thank you,  RESTRICTIONS:  During your procedure today, you received medications for sedation.  These   medications may affect your judgment, balance and coordination.  Therefore,   for 24 hours, you have the following restrictions:   - DO NOT drive a car, operate machinery, make legal/financial decisions,   sign important papers or drink alcohol.    ACTIVITY:  Today: no heavy lifting, straining or running due to procedural   sedation/anesthesia.  The following day: return to full activity including work.  DIET:  Eat and drink normally unless instructed otherwise.     TREATMENT FOR COMMON SIDE EFFECTS:  - Mild abdominal pain, nausea, belching, bloating or excessive gas:  rest,   eat lightly and use a heating pad.  - Sore Throat: treat with throat lozenges and/or gargle with warm salt   water.  - Because air was used during the procedure, expelling large amounts of air   from your rectum or belching is normal.  - If a bowel prep was taken, you may not have a bowel movement for 1-3 days.    This is normal.  SYMPTOMS TO WATCH FOR AND REPORT TO YOUR PHYSICIAN:  1. Abdominal pain or bloating, other than gas cramps.  2. Chest pain.  3. Back pain.  4. Signs of infection such as: chills or fever occurring within 24 hours   after the procedure.  5. Rectal bleeding, which would show as bright red, maroon, or black stools.   (A tablespoon of blood from the rectum is not serious, especially  if   hemorrhoids are present.)  6. Vomiting.  7. Weakness or dizziness.  GO DIRECTLY TO THE NEAREST EMERGENCY ROOM IF YOU HAVE ANY OF THE FOLLOWING:      Difficulty breathing              Chills and/or fever over 101 F   Persistent vomiting and/or vomiting blood   Severe abdominal pain   Severe chest pain   Black, tarry stools   Bleeding- more than one tablespoon   Any other symptom or condition that you feel may need urgent attention  Your doctor recommends these additional instructions:  If any biopsies were taken, your doctors clinic will contact you in 1 to 2   weeks with any results.  - Patient has a contact number available for emergencies.  The signs and   symptoms of potential delayed complications were discussed with the   patient.  Return to normal activities tomorrow.  Written discharge   instructions were provided to the patient.   - Discharge patient to home.   - Resume previous diet.   - Continue present medications.   - Repeat colonoscopy in 10 years for screening purposes.   For questions, problems or results please call your physician - Angelo Morales MD at Work:  [phone].  [facility], EMERGENCY ROOM PHONE NUMBER: [phone]  IF A COMPLICATION OR EMERGENCY SITUATION ARISES AND YOU ARE UNABLE TO REACH   YOUR PHYSICIAN - GO DIRECTLY TO THE EMERGENCY ROOM.  Angelo Morales MD  9/22/2023 8:19:02 AM  This report has been verified and signed electronically.  Dear patient,  As a result of recent federal legislation (The Federal Cures Act), you may   receive lab or pathology results from your procedure in your [facility]   account before your physician is able to contact you. Your physician or   their representative will relay the results to you with their   recommendations at their soonest availability.  Thank you,  PROVATION

## 2023-09-22 NOTE — PLAN OF CARE
Discharge instructions provided to the patient. Patient verbalized understanding of the instructions.  IV removed, cath tip intact.  VSS.   No concerns voiced. Escorted patient via wheelchair to family member awaiting in private vehicle.

## 2023-09-22 NOTE — ANESTHESIA POSTPROCEDURE EVALUATION
Anesthesia Post Evaluation    Patient: Marlon Farr    Procedure(s) Performed: Procedure(s) (LRB):  COLONOSCOPY (N/A)    Final Anesthesia Type: general      Patient location during evaluation: GI PACU  Patient participation: Yes- Able to Participate  Level of consciousness: awake and alert and oriented  Post-procedure vital signs: reviewed and stable  Pain management: adequate  Airway patency: patent    PONV status at discharge: No PONV  Anesthetic complications: no      Cardiovascular status: hemodynamically stable  Respiratory status: unassisted  Hydration status: euvolemic  Follow-up not needed.          Vitals Value Taken Time   /81 09/22/23 0819   Temp 37.2 °C (99 °F) 09/22/23 0818   Pulse 83 09/22/23 0827   Resp 18 09/22/23 0827   SpO2 98 % 09/22/23 0827   Vitals shown include unvalidated device data.      No case tracking events are documented in the log.      Pain/Trinh Score: Trinh Score: 9 (9/22/2023  8:20 AM)

## 2023-09-22 NOTE — TRANSFER OF CARE
"Anesthesia Transfer of Care Note    Patient: Marlon Farr    Procedure(s) Performed: Procedure(s) (LRB):  COLONOSCOPY (N/A)    Patient location: PACU    Anesthesia Type: general    Transport from OR: Transported from OR on room air with adequate spontaneous ventilation    Post pain: adequate analgesia    Post assessment: no apparent anesthetic complications and tolerated procedure well    Post vital signs: stable    Level of consciousness: awake    Nausea/Vomiting: no nausea/vomiting    Complications: none    Transfer of care protocol was followed      Last vitals:   Visit Vitals  BP (!) 160/100   Pulse 95   Temp 36.6 °C (97.9 °F)   Resp 18   Ht 6' 1" (1.854 m)   Wt 86.6 kg (191 lb)   SpO2 99%   BMI 25.20 kg/m²     "

## 2023-09-22 NOTE — H&P
Short Stay Endoscopy History and Physical    PCP - Scot Sanchez MD    Procedure - Colonoscopy  Sedation: GA  ASA - per anesthesia  Mallampati - per anesthesia  History of Anesthesia problems - no  Family history Anesthesia problems -  no     HPI:  This is a 53 y.o. male here for evaluation of : Screening for CRC    Reflux - no  Dysphagia - no  Abdominal pain - no  Diarrhea - no    ROS:  Constitutional: No fevers, chills, No weight loss  ENT: No allergies  CV: No chest pain  Pulm: No cough, No shortness of breath  Ophtho: No vision changes  GI: see HPI  Medical History:  has no past medical history on file.    Surgical History:  has a past surgical history that includes LASIK.    Family History: family history includes Diverticulitis in his mother; Hypertension in his father.. Otherwise no colon cancer, inflammatory bowel disease, or GI malignancies.    Social History:  reports that he has never smoked. He has never used smokeless tobacco. He reports current alcohol use. He reports that he does not currently use drugs.    Review of patient's allergies indicates:  No Known Allergies    Medications:   Medications Prior to Admission   Medication Sig Dispense Refill Last Dose    hydroCHLOROthiazide (HYDRODIURIL) 50 MG tablet Take 1 tablet (50 mg total) by mouth once daily. 90 tablet 1 9/21/2023 at 0800    multivitamin capsule Take 1 capsule by mouth once daily.   9/20/2023       Objective Findings:    Vital Signs: Per nursing notes.    Physical Exam:  General Appearance: Well appearing in no acute distress  Head:   Normocephalic, without obvious abnormality  Eyes:    No scleral icterus  Airway: Open  Neck: No restriction in mobility  Lungs: CTA bilaterally in anterior and posterior fields, no wheezes, no crackles.  Heart:  Regular rate and rhythm, S1, S2 normal, no murmurs heard  Abdomen: Soft, non tender, non distended      Labs:  Lab Results   Component Value Date    WBC 4.71 03/13/2023    HGB 15.0 03/13/2023     HCT 45.2 03/13/2023     03/13/2023    CHOL 220 (H) 03/13/2023    TRIG 123 03/13/2023    HDL 63 03/13/2023    ALT 35 03/13/2023    ALT 35 03/13/2023    AST 29 03/13/2023    AST 29 03/13/2023     03/13/2023     03/13/2023    K 4.7 03/13/2023    K 4.7 03/13/2023     03/13/2023     03/13/2023    CREATININE 0.9 03/13/2023    CREATININE 0.9 03/13/2023    BUN 12 03/13/2023    BUN 12 03/13/2023    CO2 26 03/13/2023    CO2 26 03/13/2023    TSH 2.933 03/13/2023    PSA 1.2 02/24/2022    HGBA1C 5.3 03/13/2023         I have explained the risks and benefits of endoscopy procedures to the patient including but not limited to bleeding, perforation, infection, and death.    Thank you so much for allowing me to participate in the care of Marlon Morales MD

## 2024-03-27 DIAGNOSIS — I10 HTN (HYPERTENSION): ICD-10-CM

## 2024-05-21 ENCOUNTER — OFFICE VISIT (OUTPATIENT)
Dept: PRIMARY CARE CLINIC | Facility: CLINIC | Age: 54
End: 2024-05-21
Payer: COMMERCIAL

## 2024-05-21 VITALS
DIASTOLIC BLOOD PRESSURE: 84 MMHG | HEIGHT: 73 IN | BODY MASS INDEX: 25.92 KG/M2 | WEIGHT: 195.56 LBS | RESPIRATION RATE: 18 BRPM | HEART RATE: 65 BPM | SYSTOLIC BLOOD PRESSURE: 138 MMHG | OXYGEN SATURATION: 98 % | TEMPERATURE: 98 F

## 2024-05-21 DIAGNOSIS — Z13.6 ENCOUNTER FOR LIPID SCREENING FOR CARDIOVASCULAR DISEASE: ICD-10-CM

## 2024-05-21 DIAGNOSIS — H93.12 TINNITUS OF LEFT EAR: ICD-10-CM

## 2024-05-21 DIAGNOSIS — H91.92 DECREASED HEARING OF LEFT EAR: ICD-10-CM

## 2024-05-21 DIAGNOSIS — Z12.5 SCREENING PSA (PROSTATE SPECIFIC ANTIGEN): ICD-10-CM

## 2024-05-21 DIAGNOSIS — Z00.00 ANNUAL PHYSICAL EXAM: ICD-10-CM

## 2024-05-21 DIAGNOSIS — H61.22 IMPACTED CERUMEN OF LEFT EAR: Primary | ICD-10-CM

## 2024-05-21 DIAGNOSIS — H60.543 DERMATITIS OF BOTH EAR CANALS: ICD-10-CM

## 2024-05-21 DIAGNOSIS — Z13.220 ENCOUNTER FOR LIPID SCREENING FOR CARDIOVASCULAR DISEASE: ICD-10-CM

## 2024-05-21 PROCEDURE — 3008F BODY MASS INDEX DOCD: CPT | Mod: CPTII,S$GLB,, | Performed by: FAMILY MEDICINE

## 2024-05-21 PROCEDURE — 3075F SYST BP GE 130 - 139MM HG: CPT | Mod: CPTII,S$GLB,, | Performed by: FAMILY MEDICINE

## 2024-05-21 PROCEDURE — 1159F MED LIST DOCD IN RCRD: CPT | Mod: CPTII,S$GLB,, | Performed by: FAMILY MEDICINE

## 2024-05-21 PROCEDURE — 99999 PR PBB SHADOW E&M-EST. PATIENT-LVL V: CPT | Mod: PBBFAC,,, | Performed by: FAMILY MEDICINE

## 2024-05-21 PROCEDURE — 99214 OFFICE O/P EST MOD 30 MIN: CPT | Mod: S$GLB,,, | Performed by: FAMILY MEDICINE

## 2024-05-21 PROCEDURE — 1160F RVW MEDS BY RX/DR IN RCRD: CPT | Mod: CPTII,S$GLB,, | Performed by: FAMILY MEDICINE

## 2024-05-21 PROCEDURE — 3079F DIAST BP 80-89 MM HG: CPT | Mod: CPTII,S$GLB,, | Performed by: FAMILY MEDICINE

## 2024-05-21 RX ORDER — FLUOCINOLONE ACETONIDE 0.11 MG/ML
1 OIL AURICULAR (OTIC) DAILY PRN
Qty: 20 ML | Refills: 0 | Status: SHIPPED | OUTPATIENT
Start: 2024-05-21 | End: 2024-05-21 | Stop reason: SDUPTHER

## 2024-05-21 RX ORDER — FLUOCINOLONE ACETONIDE 0.11 MG/ML
1 OIL AURICULAR (OTIC) DAILY PRN
Qty: 20 ML | Refills: 0 | Status: SHIPPED | OUTPATIENT
Start: 2024-05-21

## 2024-05-21 RX ORDER — ASCORBIC ACID 1000 MG
175 TABLET ORAL DAILY
COMMUNITY

## 2024-05-21 NOTE — PATIENT INSTRUCTIONS
Debrox ear wax removal drops or similar-do drop for a few nights the fill cup with warm or room temp water and rinse with suction bulb    Get suction bulb

## 2024-05-21 NOTE — PROGRESS NOTES
"Subjective:       Patient ID: Marlon Farr is a 53 y.o. male.    Chief Complaint: Ear Fullness (Both ear is clogged right ear come and goes/States that its been clogged for a month. )    HPI 52 y/o male with BPPV, HTN is here with ear fullness.    L ear discomfort x 4 weeks, R ear slight discomfort, in the past he had wax build up and it was resolved with ear cleaning, he is trying to avoid Qtips.   He feels his hearing is decreased on the L, he denies ear drainage, some "slight ringing" in L ear, he is feeling good otherwise, he denies f/n/v, he did have sinus sx a few ago that has resolved. He tried sudafed x 4 days that did not help. Appetite good, he tries to eat healthy. He is active and walking, jogging and sprinting 4 days a week.     Home bp 130's/80's    OTC: milk thistle    Review of Systems  see HPI  Objective:      /84 (BP Location: Left arm, Patient Position: Sitting, BP Method: Small (Manual))   Pulse 65   Temp 98.2 °F (36.8 °C) (Oral)   Resp 18   Ht 6' 1" (1.854 m)   Wt 88.7 kg (195 lb 8.8 oz)   SpO2 98%   BMI 25.80 kg/m²   Physical Exam  Vitals and nursing note reviewed.   Constitutional:       Appearance: He is well-developed.   HENT:      Head: Normocephalic and atraumatic.      Right Ear: Tympanic membrane normal.      Left Ear: There is impacted cerumen.      Mouth/Throat:      Pharynx: No oropharyngeal exudate or posterior oropharyngeal erythema.   Neck:      Thyroid: No thyromegaly.   Cardiovascular:      Rate and Rhythm: Normal rate and regular rhythm.      Heart sounds: Normal heart sounds.   Pulmonary:      Effort: Pulmonary effort is normal. No respiratory distress.      Breath sounds: Normal breath sounds.   Musculoskeletal:      Cervical back: Normal range of motion and neck supple.   Lymphadenopathy:      Cervical: No cervical adenopathy.   Skin:     General: Skin is warm and dry.   Neurological:      Mental Status: He is alert.         Assessment:       1. Impacted " cerumen of left ear    2. Annual physical exam    3. Encounter for lipid screening for cardiovascular disease    4. Screening PSA (prostate specific antigen)    5. Decreased hearing of left ear    6. Tinnitus of left ear    7. Dermatitis of both ear canals        Plan:   Marlon was seen today for ear fullness.    Diagnoses and all orders for this visit:    Impacted cerumen of left ear  -     Ambulatory referral/consult to ENT; Future    Annual physical exam  -     CBC Auto Differential; Future  -     Comprehensive Metabolic Panel; Future  -     Hemoglobin A1C; Future  -     Lipid Panel; Future  -     PSA, Screening; Future  -     TSH; Future  -     TESTOSTERONE PANEL; Future    Encounter for lipid screening for cardiovascular disease  -     Lipid Panel; Future    Screening PSA (prostate specific antigen)  -     PSA, Screening; Future    Decreased hearing of left ear  -     Ambulatory referral/consult to ENT; Future    Tinnitus of left ear  -     Ambulatory referral/consult to ENT; Future    Dermatitis of both ear canals  -     Discontinue: fluocinolone acetonide oiL 0.01 % Drop; Place 1 drop in ear(s) daily as needed.  -     fluocinolone acetonide oiL 0.01 % Drop; Place 1 drop in ear(s) daily as needed.

## 2024-05-22 ENCOUNTER — OFFICE VISIT (OUTPATIENT)
Dept: OTOLARYNGOLOGY | Facility: CLINIC | Age: 54
End: 2024-05-22
Payer: COMMERCIAL

## 2024-05-22 VITALS — WEIGHT: 195.56 LBS | HEIGHT: 73 IN | BODY MASS INDEX: 25.92 KG/M2

## 2024-05-22 DIAGNOSIS — H61.22 IMPACTED CERUMEN OF LEFT EAR: ICD-10-CM

## 2024-05-22 DIAGNOSIS — H91.92 DECREASED HEARING OF LEFT EAR: ICD-10-CM

## 2024-05-22 DIAGNOSIS — H93.12 TINNITUS OF LEFT EAR: ICD-10-CM

## 2024-05-22 PROCEDURE — 69210 REMOVE IMPACTED EAR WAX UNI: CPT | Mod: S$GLB,,, | Performed by: OTOLARYNGOLOGY

## 2024-05-22 PROCEDURE — 99999 PR PBB SHADOW E&M-EST. PATIENT-LVL III: CPT | Mod: PBBFAC,,, | Performed by: OTOLARYNGOLOGY

## 2024-05-22 PROCEDURE — 99499 UNLISTED E&M SERVICE: CPT | Mod: 25,S$GLB,, | Performed by: OTOLARYNGOLOGY

## 2024-05-22 NOTE — PROGRESS NOTES
"REFERRING PROVIDER  Nhi Fuentes Md  [address] [place] [address]  Subjective:   Patient: Marlon Farr [id number], :1970   Visit date:2024 10:08 AM    Chief Complaint:  Cerumen Impaction (Pt has come in due to possible wax in both ears )        HPI:     Marlon Farr is a 53 y.o. male whom I am asked to see for evaluation of otalgia or hearing loss in both ears for the past 1-4 weeks.   Marlon rates the severity as moderate.  No exacerbating or relieving factors.  There is no drainage from the ears.      His meds, allergies, medical, surgical, social & family histories were reviewed & updated:  -     He has a current medication list which includes the following prescription(s): fluocinolone acetonide oil, hydrochlorothiazide, milk thistle, and multivitamin.  -     He  has no past medical history on file.   -     He does not have any pertinent problems on file.   -     He  has a past surgical history that includes LASIK; Colonoscopy (N/A, 2023); and Eye surgery.  -     He  reports that he has never smoked. He has never used smokeless tobacco. He reports current alcohol use. He reports that he does not currently use drugs.  -     His family history includes Dementia in his father; Diverticulitis in his mother; Hypertension in his father.  -     He has No Known Allergies.      Review of Systems:  -     Allergic/Immunologic: has No Known Allergies..  -     Constitutional: Current temp:          Objective:     Physical Exam:  Vitals:  Ht 6' 1" (1.854 m)   Wt 88.7 kg (195 lb 8.8 oz)   BMI 25.80 kg/m²   Communication:  Able to communicate, no hoarseness.  Head & Face:  Normocephalic, atraumatic, no sinus tenderness.  Eyes:  Extraocular motions intact.  Ears:  Otoscopy of external auditory canals reveals impaction of bilateral ear canals.  With the patient in the supine position, we used the operating microscope to examine both ears with the appropriate sized ear speculum.  A " variety of sterile, micro-instruments were utilized to remove the cerumen atraumatically from the impacted ear(s).   After removal, the ears were reexamined-  Right Ear:  No mass/lesion of auricle. The external auditory canals is without erythema or discharge. Pneumatic otoscopy of the tympanic membrane revealed no perforation and good mobility, with no fluid in middle ear. Clinical speech reception thresholds grossly normal.  Left Ear:  No mass/lesion of auricle. The external auditory canals is without erythema or discharge. Pneumatic otoscopy of the tympanic membrane revealed no perforation and good mobility, with no fluid in middle ear. Clinical speech reception thresholds grossly normal  Nose:  No masses/lesions of external nose, nasal mucosa, septum, and turbinates were within normal limits.  Mouth:  No mass/lesion of lips, teeth, gums, hard/soft palate, tongue, tonsils, or oropharynx.  Neck & Lymphatics:  No cervical lymphadenopathy, no neck mass/crepitus/ asymmetry, trachea is midline, no thyroid enlargement/tenderness/mass.  Neuro/Psych: Alert with normal mood and affect.   Respiration/Chest:  Symmetric expansion during respiration, normal respiratory effort.  Skin:  Warm and intact.    Assessment & Plan:       -     Cerumen Impaction - Marlon has cerumen impaction.  We discussed preventative measures and treatment options.  Q-tips must be avoided, instead the ears can be cleaned with OTC ear rinses (or a mixture of alcohol & vinegar in equal parts).   For hard wax, Marlon may place mineral oil/baby oil in the ear with a cotton ball at night and remove in the shower.  This will assist in softening the wax and allow it to drain out on its own. If the cerumen impacts the ear canal and causes hearing loss or infection he needs to follow-up in the clinic for treatment and cleaning.

## 2024-06-15 DIAGNOSIS — I10 HYPERTENSION, UNSPECIFIED TYPE: ICD-10-CM

## 2024-06-15 NOTE — TELEPHONE ENCOUNTER
Care Due:                  Date            Visit Type   Department     Provider  --------------------------------------------------------------------------------                                SAME DAY -                              ESTABLISHED   [facility]  Last Visit: 05-      PATIENT      Care           Nhi Fuentes                              EP -                              PRIMARY      [facility]  Next Visit: 06-      CARE ([facility])   Care           Scot Sanchez                                                            Last  Test          Frequency    Reason                     Performed    Due Date  --------------------------------------------------------------------------------    CMP.........  12 months..  hydroCHLOROthiazide......  03- 03-    [facility] Embedded Care Due Messages. Reference number: [id number].   6/15/2024 7:27:19 AM CDT

## 2024-06-16 RX ORDER — HYDROCHLOROTHIAZIDE 50 MG/1
50 TABLET ORAL
Qty: 90 TABLET | Refills: 1 | Status: SHIPPED | OUTPATIENT
Start: 2024-06-16

## 2024-06-16 NOTE — TELEPHONE ENCOUNTER
Refill Routing Note   Medication(s) are not appropriate for processing by [facility] for the following reason(s):        Required labs outdated    [facility] action(s):  Defer   Requires labs : Yes             Appointments  past 12m or future 3m with PCP    Date Provider   Last Visit   8/3/2023 Scot Sanchez MD   Next Visit   6/27/2024 Scot Sanchez MD   ED visits in past 90 days: 0        Note composed:9:58 PM 06/15/2024

## 2024-06-19 ENCOUNTER — LAB VISIT (OUTPATIENT)
Dept: LAB | Facility: HOSPITAL | Age: 54
End: 2024-06-19
Attending: FAMILY MEDICINE
Payer: COMMERCIAL

## 2024-06-19 DIAGNOSIS — Z12.5 SCREENING PSA (PROSTATE SPECIFIC ANTIGEN): ICD-10-CM

## 2024-06-19 DIAGNOSIS — Z13.6 ENCOUNTER FOR LIPID SCREENING FOR CARDIOVASCULAR DISEASE: ICD-10-CM

## 2024-06-19 DIAGNOSIS — Z00.00 ANNUAL PHYSICAL EXAM: ICD-10-CM

## 2024-06-19 DIAGNOSIS — Z13.220 ENCOUNTER FOR LIPID SCREENING FOR CARDIOVASCULAR DISEASE: ICD-10-CM

## 2024-06-19 LAB
ALBUMIN SERPL BCP-MCNC: 4.1 G/DL (ref 3.5–5.2)
ALP SERPL-CCNC: 47 U/L (ref 55–135)
ALT SERPL W/O P-5'-P-CCNC: 39 U/L (ref 10–44)
ANION GAP SERPL CALC-SCNC: 12 MMOL/L (ref 8–16)
AST SERPL-CCNC: 27 U/L (ref 10–40)
BASOPHILS # BLD AUTO: 0.04 K/UL (ref 0–0.2)
BASOPHILS NFR BLD: 1 % (ref 0–1.9)
BILIRUB SERPL-MCNC: 0.6 MG/DL (ref 0.1–1)
BUN SERPL-MCNC: 16 MG/DL (ref 6–20)
CALCIUM SERPL-MCNC: 9.7 MG/DL (ref 8.7–10.5)
CHLORIDE SERPL-SCNC: 101 MMOL/L (ref 95–110)
CHOLEST SERPL-MCNC: 239 MG/DL (ref 120–199)
CHOLEST/HDLC SERPL: 3.7 {RATIO} (ref 2–5)
CO2 SERPL-SCNC: 28 MMOL/L (ref 23–29)
COMPLEXED PSA SERPL-MCNC: 1.3 NG/ML (ref 0–4)
CREAT SERPL-MCNC: 1.1 MG/DL (ref 0.5–1.4)
DIFFERENTIAL METHOD BLD: ABNORMAL
EOSINOPHIL # BLD AUTO: 0.2 K/UL (ref 0–0.5)
EOSINOPHIL NFR BLD: 4 % (ref 0–8)
ERYTHROCYTE [DISTWIDTH] IN BLOOD BY AUTOMATED COUNT: 12 % (ref 11.5–14.5)
EST. GFR  (NO RACE VARIABLE): >60 ML/MIN/1.73 M^2
ESTIMATED AVG GLUCOSE: 103 MG/DL (ref 68–131)
GLUCOSE SERPL-MCNC: 106 MG/DL (ref 70–110)
HBA1C MFR BLD: 5.2 % (ref 4–5.6)
HCT VFR BLD AUTO: 45 % (ref 40–54)
HDLC SERPL-MCNC: 64 MG/DL (ref 40–75)
HDLC SERPL: 26.8 % (ref 20–50)
HGB BLD-MCNC: 15.8 G/DL (ref 14–18)
IMM GRANULOCYTES # BLD AUTO: 0.01 K/UL (ref 0–0.04)
IMM GRANULOCYTES NFR BLD AUTO: 0.3 % (ref 0–0.5)
LDLC SERPL CALC-MCNC: 159.2 MG/DL (ref 63–159)
LYMPHOCYTES # BLD AUTO: 1.2 K/UL (ref 1–4.8)
LYMPHOCYTES NFR BLD: 30.5 % (ref 18–48)
MCH RBC QN AUTO: 32.2 PG (ref 27–31)
MCHC RBC AUTO-ENTMCNC: 35.1 G/DL (ref 32–36)
MCV RBC AUTO: 92 FL (ref 82–98)
MONOCYTES # BLD AUTO: 0.4 K/UL (ref 0.3–1)
MONOCYTES NFR BLD: 9.5 % (ref 4–15)
NEUTROPHILS # BLD AUTO: 2.2 K/UL (ref 1.8–7.7)
NEUTROPHILS NFR BLD: 54.7 % (ref 38–73)
NONHDLC SERPL-MCNC: 175 MG/DL
NRBC BLD-RTO: 0 /100 WBC
PLATELET # BLD AUTO: 239 K/UL (ref 150–450)
PMV BLD AUTO: 9.7 FL (ref 9.2–12.9)
POTASSIUM SERPL-SCNC: 4.8 MMOL/L (ref 3.5–5.1)
PROT SERPL-MCNC: 7.5 G/DL (ref 6–8.4)
RBC # BLD AUTO: 4.91 M/UL (ref 4.6–6.2)
SODIUM SERPL-SCNC: 141 MMOL/L (ref 136–145)
TRIGL SERPL-MCNC: 79 MG/DL (ref 30–150)
TSH SERPL DL<=0.005 MIU/L-ACNC: 2.45 UIU/ML (ref 0.4–4)
WBC # BLD AUTO: 4 K/UL (ref 3.9–12.7)

## 2024-06-19 PROCEDURE — 80061 LIPID PANEL: CPT | Performed by: FAMILY MEDICINE

## 2024-06-19 PROCEDURE — 84403 ASSAY OF TOTAL TESTOSTERONE: CPT | Performed by: FAMILY MEDICINE

## 2024-06-19 PROCEDURE — 84153 ASSAY OF PSA TOTAL: CPT | Performed by: FAMILY MEDICINE

## 2024-06-19 PROCEDURE — 80053 COMPREHEN METABOLIC PANEL: CPT | Performed by: FAMILY MEDICINE

## 2024-06-19 PROCEDURE — 84270 ASSAY OF SEX HORMONE GLOBUL: CPT | Performed by: FAMILY MEDICINE

## 2024-06-19 PROCEDURE — 36415 COLL VENOUS BLD VENIPUNCTURE: CPT | Mod: PN | Performed by: FAMILY MEDICINE

## 2024-06-19 PROCEDURE — 85025 COMPLETE CBC W/AUTO DIFF WBC: CPT | Performed by: FAMILY MEDICINE

## 2024-06-19 PROCEDURE — 84443 ASSAY THYROID STIM HORMONE: CPT | Performed by: FAMILY MEDICINE

## 2024-06-19 PROCEDURE — 83036 HEMOGLOBIN GLYCOSYLATED A1C: CPT | Performed by: FAMILY MEDICINE

## 2024-06-25 LAB — ALBUMIN SERPL-MCNC: 4.6 G/DL (ref 3.6–5.1)

## 2024-06-27 ENCOUNTER — OFFICE VISIT (OUTPATIENT)
Dept: PRIMARY CARE CLINIC | Facility: CLINIC | Age: 54
End: 2024-06-27
Payer: COMMERCIAL

## 2024-06-27 VITALS
RESPIRATION RATE: 16 BRPM | HEART RATE: 68 BPM | SYSTOLIC BLOOD PRESSURE: 138 MMHG | BODY MASS INDEX: 25.86 KG/M2 | HEIGHT: 73 IN | DIASTOLIC BLOOD PRESSURE: 86 MMHG | OXYGEN SATURATION: 97 % | WEIGHT: 195.13 LBS

## 2024-06-27 DIAGNOSIS — I10 HYPERTENSION, UNSPECIFIED TYPE: ICD-10-CM

## 2024-06-27 DIAGNOSIS — E78.5 DYSLIPIDEMIA: ICD-10-CM

## 2024-06-27 DIAGNOSIS — Z00.00 WELL ADULT EXAM: Primary | ICD-10-CM

## 2024-06-27 PROBLEM — R73.01 IFG (IMPAIRED FASTING GLUCOSE): Status: RESOLVED | Noted: 2023-03-10 | Resolved: 2024-06-27

## 2024-06-27 PROCEDURE — 3079F DIAST BP 80-89 MM HG: CPT | Mod: CPTII,S$GLB,, | Performed by: INTERNAL MEDICINE

## 2024-06-27 PROCEDURE — 3008F BODY MASS INDEX DOCD: CPT | Mod: CPTII,S$GLB,, | Performed by: INTERNAL MEDICINE

## 2024-06-27 PROCEDURE — 3044F HG A1C LEVEL LT 7.0%: CPT | Mod: CPTII,S$GLB,, | Performed by: INTERNAL MEDICINE

## 2024-06-27 PROCEDURE — 1159F MED LIST DOCD IN RCRD: CPT | Mod: CPTII,S$GLB,, | Performed by: INTERNAL MEDICINE

## 2024-06-27 PROCEDURE — 99999 PR PBB SHADOW E&M-EST. PATIENT-LVL III: CPT | Mod: PBBFAC,,, | Performed by: INTERNAL MEDICINE

## 2024-06-27 PROCEDURE — 99396 PREV VISIT EST AGE 40-64: CPT | Mod: S$GLB,,, | Performed by: INTERNAL MEDICINE

## 2024-06-27 PROCEDURE — 3075F SYST BP GE 130 - 139MM HG: CPT | Mod: CPTII,S$GLB,, | Performed by: INTERNAL MEDICINE

## 2024-06-27 RX ORDER — PNV NO.95/FERROUS FUM/FOLIC AC 28MG-0.8MG
100 TABLET ORAL DAILY
COMMUNITY

## 2024-06-27 NOTE — PROGRESS NOTES
[facility] Progress Note  6/27/2024          Reason for Visit:      had concerns including Annual Exam.       History of Present Illness:     Patient is here today for a checkup.  He has no specific new complaints.  He did have some trouble with some impacted cerumen recently and ended up seeing ENT in [place] and had it cleared.  He is doing well since then    He is under a lot of stress taking care of his father has Alzheimer's.  He says that has not bothering him enough to want to take medication, but he is feeling down at times.  He says that is atypical for him.      HTN  ON HCTZ 50 mg daily  Patient says he has been monitoring his blood pressure at home and that it has usually in the 130s on the top and 80s on the bottom.  Has not had any side effects on the hydrochlorothiazide.     HM  Encouraged COVID shot and shingles shot at the pharmacy  Otherwise he is up-to-date        Problem List:     Patient Active Problem List   Diagnosis    HTN (hypertension)         Medications:       Current Outpatient Medications:     cyanocobalamin (VITAMIN B-12) 100 MCG tablet, Take 100 mcg by mouth once daily., Disp: , Rfl:     hydroCHLOROthiazide (HYDRODIURIL) 50 MG tablet, TAKE 1 TABLET BY MOUTH EVERY DAY, Disp: 90 tablet, Rfl: 1    milk thistle 175 mg tablet, Take 175 mg by mouth once daily., Disp: , Rfl:     multivitamin capsule, Take 1 capsule by mouth once daily., Disp: , Rfl:     fluocinolone acetonide oiL 0.01 % Drop, Place 1 drop in ear(s) daily as needed. (Patient not taking: Reported on 6/27/2024), Disp: 20 mL, Rfl: 0        Review of Systems:     Review of Systems   Constitutional:  Negative for chills and fever.   HENT:  Negative for ear pain and sore throat.    Respiratory:  Negative for cough, shortness of breath and wheezing.    Cardiovascular:  Negative for chest pain and palpitations.   Gastrointestinal:  Negative for constipation, diarrhea, nausea and vomiting.   Genitourinary:   "Negative for dysuria and hematuria.   Musculoskeletal:  Negative for joint swelling and joint deformity.   Neurological:  Negative for dizziness and weakness.           Physical Exam:     Temp:             Blood Pressure:  138/86        Pulse:   68     Respirations:  16  Weight:   88.5 kg (195 lb 1.7 oz)  Height:   6' 1" (1.854 m)  BMI:     Body mass index is 25.74 kg/m².    Physical Exam  Constitutional:       General: He is not in acute distress.  HENT:      Right Ear: Tympanic membrane normal.      Left Ear: Tympanic membrane normal.      Nose: No congestion or rhinorrhea.      Mouth/Throat:      Pharynx: No oropharyngeal exudate or posterior oropharyngeal erythema.   Cardiovascular:      Rate and Rhythm: Normal rate and regular rhythm.   Pulmonary:      Effort: Pulmonary effort is normal. No respiratory distress.      Breath sounds: No wheezing.   Abdominal:      Palpations: Abdomen is soft.      Tenderness: There is no abdominal tenderness.   Skin:     General: Skin is warm.   Neurological:      General: No focal deficit present.      Mental Status: He is alert and oriented to person, place, and time.           Labs/Imaging/Testing:     Most recent CBC:  Lab Results   Component Value Date    WBC 4.00 06/19/2024    HGB 15.8 06/19/2024     06/19/2024    MCV 92 06/19/2024       Most recent Lipids:  Lab Results   Component Value Date    CHOL 239 (H) 06/19/2024    HDL 64 06/19/2024    LDLCALC 159.2 (H) 06/19/2024    TRIG 79 06/19/2024       Most recent Chemistry:  Lab Results   Component Value Date     06/19/2024    K 4.8 06/19/2024     06/19/2024    CO2 28 06/19/2024    BUN 16 06/19/2024    CREATININE 1.1 06/19/2024     06/19/2024    CALCIUM 9.7 06/19/2024    ALT 39 06/19/2024    AST 27 06/19/2024    ALKPHOS 47 (L) 06/19/2024    PROT 7.5 06/19/2024    ALBUMIN 4.1 06/19/2024    ALBUMIN 4.6 06/19/2024       Other tests:  Lab Results   Component Value Date    TSH 2.452 06/19/2024    PSA 1.3 " 06/19/2024    HGBA1C 5.2 06/19/2024       Assessment and Plan:     1. Well adult exam    2. Hypertension, unspecified type  Continue current dose of hydrochlorothiazide    3. Dyslipidemia   We discussed statin.  His 10 year risk calculates to 6.1%.  For now he prefers to work on lifestyle change.  We will plan to recheck again in September    [facility] in 3 months or sooner noble Sanchez MD  6/27/2024    This note was prepared using voice-recognition software.  Although efforts are made to proofread the note, some errors may persist in the final document.

## 2024-12-10 DIAGNOSIS — I10 HYPERTENSION, UNSPECIFIED TYPE: ICD-10-CM

## 2024-12-10 RX ORDER — HYDROCHLOROTHIAZIDE 50 MG/1
50 TABLET ORAL
Qty: 90 TABLET | Refills: 1 | Status: SHIPPED | OUTPATIENT
Start: 2024-12-10

## 2024-12-10 NOTE — TELEPHONE ENCOUNTER
No care due was identified.  [facility] Embedded Care Due Messages. Reference number: [id number].   12/10/2024 12:34:58 AM CST

## 2024-12-10 NOTE — TELEPHONE ENCOUNTER
Refill Decision Note   Marlon Farr  is requesting a refill authorization.  Brief Assessment and Rationale for Refill:  Approve     Medication Therapy Plan:         Comments:     Note composed:12:11 PM 12/10/2024

## 2024-12-16 DIAGNOSIS — I10 HYPERTENSION, UNSPECIFIED TYPE: ICD-10-CM

## 2024-12-16 RX ORDER — HYDROCHLOROTHIAZIDE 50 MG/1
50 TABLET ORAL DAILY
Qty: 90 TABLET | Refills: 1 | Status: SHIPPED | OUTPATIENT
Start: 2024-12-16

## 2024-12-16 NOTE — TELEPHONE ENCOUNTER
No care due was identified.  [facility] Embedded Care Due Messages. Reference number: [id number].   12/16/2024 9:24:19 AM CST

## 2025-03-15 NOTE — PROGRESS NOTES
"  [facility] Primary Care Progress Note  3/20/2025          Reason for Visit:      had concerns including Hypertension (Stress work-related, started tracking it a few days numbers were above 140/110).       History of Present Illness:     Pt is here for a couple of concenrs     HTN  ON HCTZ 50 mg daily  Patient says he has been monitoring his blood pressure at home and it has been prestty consistently high.  He wants to schedule a nurse visit to correlate his home cuff before considering adjustment of meds.   Has not had any side effects on the hydrochlorothiazide.       Alcohol use  Recnetly, alcohol use has increased.  Has been under a lot of stress at work, and has had some reduction in stress after his father who had been caring for with alzheimer's passed away.  He has been drinking 3-4 coctails per evening lately.  He thinks this is more than is probably good, and has intentions to cut back.  He has recently started power walking in the evening, the time in which he would tend to drink, and hopes this will be helpful          Problem List:     Problem List[1]      Medications:     Current Medications[2]        Review of Systems:     Review of Systems   Constitutional:  Negative for chills and fever.   HENT:  Negative for ear pain and sore throat.    Respiratory:  Negative for cough, shortness of breath and wheezing.    Cardiovascular:  Negative for chest pain and palpitations.   Gastrointestinal:  Negative for constipation, diarrhea, nausea and vomiting.   Genitourinary:  Negative for dysuria and hematuria.   Musculoskeletal:  Negative for joint swelling, neck pain and joint deformity.   Neurological:  Negative for dizziness, weakness and headaches.           Physical Exam:     Temp:             Blood Pressure:  (!) 136/98        Pulse:   105     Respirations:     Weight:   88.7 kg (195 lb 8.8 oz)  Height:   6' 1" (1.854 m)  BMI:     Body mass index is 25.8 kg/m².    Physical Exam  Constitutional:       " General: He is not in acute distress.  HENT:      Right Ear: Tympanic membrane normal.      Left Ear: Tympanic membrane normal.      Nose: No congestion or rhinorrhea.      Mouth/Throat:      Pharynx: No oropharyngeal exudate or posterior oropharyngeal erythema.   Cardiovascular:      Rate and Rhythm: Normal rate and regular rhythm.   Pulmonary:      Effort: Pulmonary effort is normal. No respiratory distress.      Breath sounds: No wheezing.   Abdominal:      Palpations: Abdomen is soft.      Tenderness: There is no abdominal tenderness.   Skin:     General: Skin is warm.   Neurological:      General: No focal deficit present.      Mental Status: He is alert and oriented to person, place, and time.           Labs/Imaging/Testing:     Most recent CBC:  Lab Results   Component Value Date    WBC 4.00 06/19/2024    HGB 15.8 06/19/2024     06/19/2024    MCV 92 06/19/2024       Most recent Lipids:  Lab Results   Component Value Date    CHOL 239 (H) 06/19/2024    HDL 64 06/19/2024    LDLCALC 159.2 (H) 06/19/2024    TRIG 79 06/19/2024       Most recent Chemistry:  Lab Results   Component Value Date     06/19/2024    K 4.8 06/19/2024     06/19/2024    CO2 28 06/19/2024    BUN 16 06/19/2024    CREATININE 1.1 06/19/2024     06/19/2024    CALCIUM 9.7 06/19/2024    ALT 39 06/19/2024    AST 27 06/19/2024    ALKPHOS 47 (L) 06/19/2024    PROT 7.5 06/19/2024    ALBUMIN 4.1 06/19/2024    ALBUMIN 4.6 06/19/2024       Other tests:  Lab Results   Component Value Date    TSH 2.452 06/19/2024    PSA 1.3 06/19/2024    HGBA1C 5.2 06/19/2024         Assessment and Plan:     1. Hypertension, unspecified type  Plan a nurse visit for correlation fo blood pressure cuff tomorrow morning.  Will reach out to review home bp readings in 2-4 weeks.  If Bp remains elevated, then consider addition of another agetn (ace/arb?)    2. Alcohol use   Pt to work on cutting back           Scot Sanchez MD  3/20/2025    This note  was prepared using voice-recognition software.  Although efforts are made to proofread the note, some errors may persist in the final document.           [1]   Patient Active Problem List  Diagnosis    HTN (hypertension)   [2]   Current Outpatient Medications:     fluocinolone acetonide oiL 0.01 % Drop, Place 1 drop in ear(s) daily as needed., Disp: 20 mL, Rfl: 0    hydroCHLOROthiazide (HYDRODIURIL) 50 MG tablet, Take 1 tablet (50 mg total) by mouth once daily., Disp: 90 tablet, Rfl: 1    milk thistle 175 mg tablet, Take 175 mg by mouth once daily., Disp: , Rfl:     multivitamin capsule, Take 1 capsule by mouth once daily., Disp: , Rfl:

## 2025-03-20 ENCOUNTER — OFFICE VISIT (OUTPATIENT)
Dept: PRIMARY CARE CLINIC | Facility: CLINIC | Age: 55
End: 2025-03-20
Payer: COMMERCIAL

## 2025-03-20 VITALS
BODY MASS INDEX: 25.92 KG/M2 | HEART RATE: 105 BPM | HEIGHT: 73 IN | WEIGHT: 195.56 LBS | OXYGEN SATURATION: 96 % | SYSTOLIC BLOOD PRESSURE: 136 MMHG | DIASTOLIC BLOOD PRESSURE: 98 MMHG

## 2025-03-20 DIAGNOSIS — I10 HYPERTENSION, UNSPECIFIED TYPE: Primary | ICD-10-CM

## 2025-03-20 DIAGNOSIS — Z78.9 ALCOHOL USE: ICD-10-CM

## 2025-03-20 PROCEDURE — 99999 PR PBB SHADOW E&M-EST. PATIENT-LVL IV: CPT | Mod: PBBFAC,,, | Performed by: INTERNAL MEDICINE

## 2025-03-20 PROCEDURE — 99214 OFFICE O/P EST MOD 30 MIN: CPT | Mod: S$GLB,,, | Performed by: INTERNAL MEDICINE

## 2025-03-20 PROCEDURE — 3075F SYST BP GE 130 - 139MM HG: CPT | Mod: CPTII,S$GLB,, | Performed by: INTERNAL MEDICINE

## 2025-03-20 PROCEDURE — 1159F MED LIST DOCD IN RCRD: CPT | Mod: CPTII,S$GLB,, | Performed by: INTERNAL MEDICINE

## 2025-03-20 PROCEDURE — 3080F DIAST BP >= 90 MM HG: CPT | Mod: CPTII,S$GLB,, | Performed by: INTERNAL MEDICINE

## 2025-03-20 PROCEDURE — 3008F BODY MASS INDEX DOCD: CPT | Mod: CPTII,S$GLB,, | Performed by: INTERNAL MEDICINE

## 2025-03-21 ENCOUNTER — TELEPHONE (OUTPATIENT)
Dept: PRIMARY CARE CLINIC | Facility: CLINIC | Age: 55
End: 2025-03-21

## 2025-03-21 ENCOUNTER — PATIENT MESSAGE (OUTPATIENT)
Dept: PRIMARY CARE CLINIC | Facility: CLINIC | Age: 55
End: 2025-03-21

## 2025-03-21 ENCOUNTER — CLINICAL SUPPORT (OUTPATIENT)
Dept: PRIMARY CARE CLINIC | Facility: CLINIC | Age: 55
End: 2025-03-21
Payer: COMMERCIAL

## 2025-03-21 VITALS — DIASTOLIC BLOOD PRESSURE: 92 MMHG | SYSTOLIC BLOOD PRESSURE: 142 MMHG

## 2025-03-21 DIAGNOSIS — I10 HYPERTENSION, UNSPECIFIED TYPE: Primary | ICD-10-CM

## 2025-03-21 PROCEDURE — 99999 PR PBB SHADOW E&M-EST. PATIENT-LVL II: CPT | Mod: PBBFAC,,,

## 2025-03-21 NOTE — PROGRESS NOTES
Marlon Frar 54 y.o. male is here for Blood Pressure check. in person    Manual Blood pressure reading was  144/92, Pulse 90. (Checked at the end of the visit)    Pt's Home blood pressure machine read in office 144/103, Pulse 84.     Diagnosed with Hypertension yes.    Patient took blood pressure medication today yes.  Last dose of blood pressure medication was taken at 7:00AM. Patient took hydroCHLOROthiazide (HYDRODIURIL) 50 MG tablet .     All Medications and OTC medication updated yes    Does patient have record of home blood pressure readings / Blood Pressure Log no.     Does the pt have any complaints today in regards to their blood pressure medication? no. Complains of pt does not have any complaints, states he has been on the medication for 10 years, and it has been keeping his blood pressure in check. Patient is asymptomatic.     Were you sitting still for 5-10 minutes prior to taking your Blood pressure? yes     Has your blood pressure monitor ever been checked? yes When was last time we checked your blood pressure monitor? 03/21/2025, NURSE VISIT.    Updated vitals yes    Follow up date is Pt does not have a follow up appointment scheduled. When do you want to see the patient back?.    Dr. Sanchez notified.     Creatinine   Date Value Ref Range Status   06/19/2024 1.1 0.5 - 1.4 mg/dL Final     Sodium   Date Value Ref Range Status   06/19/2024 141 136 - 145 mmol/L Final     Potassium   Date Value Ref Range Status   06/19/2024 4.8 3.5 - 5.1 mmol/L Final

## 2025-03-21 NOTE — TELEPHONE ENCOUNTER
Marlon Farr 54 y.o. male is here for Blood Pressure check. in person    Manual Blood pressure reading was  144/92, Pulse 90. (Checked at the end of the visit)    Pt's Home blood pressure machine read in office 144/103, Pulse 84.     Diagnosed with Hypertension yes.    Patient took blood pressure medication today yes.  Last dose of blood pressure medication was taken at 7:00AM. Patient took hydroCHLOROthiazide (HYDRODIURIL) 50 MG tablet .     All Medications and OTC medication updated yes    Does patient have record of home blood pressure readings / Blood Pressure Log no.     Does the pt have any complaints today in regards to their blood pressure medication? no. Complains of pt does not have any complaints, states he has been on the medication for 10 years, and it has been keeping his blood pressure in check. Patient is asymptomatic.     Were you sitting still for 5-10 minutes prior to taking your Blood pressure? yes     Has your blood pressure monitor ever been checked? yes When was last time we checked your blood pressure monitor? 03/21/2025, NURSE VISIT.    Updated vitals yes    Follow up date is Pt does not have a follow up appointment scheduled. When do you want to see the patient back?.    Dr. Sanchez notified.     Creatinine   Date Value Ref Range Status   06/19/2024 1.1 0.5 - 1.4 mg/dL Final     Sodium   Date Value Ref Range Status   06/19/2024 141 136 - 145 mmol/L Final     Potassium   Date Value Ref Range Status   06/19/2024 4.8 3.5 - 5.1 mmol/L Final

## 2025-04-07 ENCOUNTER — OFFICE VISIT (OUTPATIENT)
Dept: PRIMARY CARE CLINIC | Facility: CLINIC | Age: 55
End: 2025-04-07
Payer: COMMERCIAL

## 2025-04-07 VITALS
HEART RATE: 76 BPM | BODY MASS INDEX: 26.21 KG/M2 | DIASTOLIC BLOOD PRESSURE: 94 MMHG | OXYGEN SATURATION: 98 % | SYSTOLIC BLOOD PRESSURE: 136 MMHG | WEIGHT: 197.75 LBS | HEIGHT: 73 IN

## 2025-04-07 DIAGNOSIS — I10 HYPERTENSION, UNSPECIFIED TYPE: Primary | ICD-10-CM

## 2025-04-07 PROCEDURE — 1159F MED LIST DOCD IN RCRD: CPT | Mod: CPTII,S$GLB,, | Performed by: INTERNAL MEDICINE

## 2025-04-07 PROCEDURE — 4010F ACE/ARB THERAPY RXD/TAKEN: CPT | Mod: CPTII,S$GLB,, | Performed by: INTERNAL MEDICINE

## 2025-04-07 PROCEDURE — 99214 OFFICE O/P EST MOD 30 MIN: CPT | Mod: S$GLB,,, | Performed by: INTERNAL MEDICINE

## 2025-04-07 PROCEDURE — 3075F SYST BP GE 130 - 139MM HG: CPT | Mod: CPTII,S$GLB,, | Performed by: INTERNAL MEDICINE

## 2025-04-07 PROCEDURE — 99999 PR PBB SHADOW E&M-EST. PATIENT-LVL III: CPT | Mod: PBBFAC,,, | Performed by: INTERNAL MEDICINE

## 2025-04-07 PROCEDURE — 3008F BODY MASS INDEX DOCD: CPT | Mod: CPTII,S$GLB,, | Performed by: INTERNAL MEDICINE

## 2025-04-07 PROCEDURE — 3080F DIAST BP >= 90 MM HG: CPT | Mod: CPTII,S$GLB,, | Performed by: INTERNAL MEDICINE

## 2025-04-07 RX ORDER — HYDROCHLOROTHIAZIDE 25 MG/1
25 TABLET ORAL DAILY
Qty: 30 TABLET | Refills: 5 | Status: SHIPPED | OUTPATIENT
Start: 2025-04-07 | End: 2026-04-07

## 2025-04-07 RX ORDER — VALSARTAN 80 MG/1
80 TABLET ORAL DAILY
Qty: 30 TABLET | Refills: 5 | Status: SHIPPED | OUTPATIENT
Start: 2025-04-07 | End: 2026-04-07

## 2025-04-07 NOTE — PROGRESS NOTES
"  [facility] Progress Note  4/7/2025          Reason for Visit:      had concerns including Hypertension (Previously was on hydrochlorthizade; bp has been 150s/100s).       History of Present Illness:     Patient presents today for follow up of elevated blood pressure    HYPERTENSION:  ON HCTZ 50 mg daily  He reports frequent blood pressure readings with diastolic values above 100 mmHg. He denies associated symptoms such as chest pain or shortness of breath. He expresses surprise at the high readings as he feels asymptomatic and remains optimistic about lower readings with home monitoring. He reports reducing alcohol consumption.        Problem List:     Problem List[1]      Medications:     Current Medications[2]        Review of Systems:     Review of Systems   Constitutional:  Negative for chills and fever.   HENT:  Negative for ear pain and sore throat.    Respiratory:  Negative for cough, shortness of breath and wheezing.    Cardiovascular:  Negative for chest pain and palpitations.   Gastrointestinal:  Negative for constipation, diarrhea, nausea and vomiting.   Genitourinary:  Negative for dysuria and hematuria.   Musculoskeletal:  Negative for joint swelling and joint deformity.   Neurological:  Negative for dizziness and weakness.           Physical Exam:     Temp:             Blood Pressure:  (!) 136/94        Pulse:   76     Respirations:     Weight:   89.7 kg (197 lb 12 oz)  Height:   6' 1" (1.854 m)  BMI:     Body mass index is 26.09 kg/m².    Physical Exam  Constitutional:       General: He is not in acute distress.  HENT:      Right Ear: Tympanic membrane normal.      Left Ear: Tympanic membrane normal.      Nose: No congestion or rhinorrhea.      Mouth/Throat:      Pharynx: No oropharyngeal exudate.   Cardiovascular:      Rate and Rhythm: Normal rate and regular rhythm.   Pulmonary:      Effort: Pulmonary effort is normal. No respiratory distress.      Breath sounds: No wheezing. "   Skin:     General: Skin is warm.   Neurological:      General: No focal deficit present.      Mental Status: He is alert and oriented to person, place, and time.         Labs/Imaging/Testing:     Most recent CBC:  Lab Results   Component Value Date    WBC 4.00 06/19/2024    HGB 15.8 06/19/2024     06/19/2024    MCV 92 06/19/2024       Most recent Lipids:  Lab Results   Component Value Date    CHOL 239 (H) 06/19/2024    HDL 64 06/19/2024    LDLCALC 159.2 (H) 06/19/2024    TRIG 79 06/19/2024       Most recent Chemistry:  Lab Results   Component Value Date     06/19/2024    K 4.8 06/19/2024     06/19/2024    CO2 28 06/19/2024    BUN 16 06/19/2024    CREATININE 1.1 06/19/2024     06/19/2024    CALCIUM 9.7 06/19/2024    ALT 39 06/19/2024    AST 27 06/19/2024    ALKPHOS 47 (L) 06/19/2024    PROT 7.5 06/19/2024    ALBUMIN 4.1 06/19/2024    ALBUMIN 4.6 06/19/2024       Other tests:  Lab Results   Component Value Date    TSH 2.452 06/19/2024    PSA 1.3 06/19/2024    HGBA1C 5.2 06/19/2024       Assessment and Plan:     1. Hypertension, unspecified type  - Monitored the patient's blood pressure, noting consistently elevated readings with diastolic frequently above 100 mmHg.  - Patient's diastolic blood pressure in the office today was 94 mmHg.  - Evaluated that the current 50 mg dose of hydrochlorothiazide is likely not more effective than 25 mg.  - Confirmed the patient reports no chest pain or dyspnea.  - Assessed that combination antihypertensive therapy tends to work better than increasing the dose of a single medication for blood pressure control.  - Discussed options for combination therapy, including ACE inhibitors and angiotensin receptor blockers, with a preference for angiotensin receptor blockers due to lower risk of cough side effect.  - Initiated combination antihypertensive therapy for better BP control:  - Prescribed valsartan 80 mg and hydrochlorothiazide 25 mg, to be taken daily in  the morning.  - Discontinued hydrochlorothiazide 50 mg daily.  - Kept hydrochlorothiazide and valsartan as separate pills initially to allow for easier dose titration.  - Educated the patient about:  - High blood pressure often being asymptomatic.  - Rare side effect of angioedema with valsartan, describing it as unilateral swelling of lips or tongue.  - The difference between angioedema and stroke symptoms.  - Instructed the patient to:  - Contact the office if experiencing any side effects from the new medications.  - Contact the office immediately and discontinue medication if experiencing swelling of lips or tongue (angioedema).  - Go to the emergency department if experiencing dyspnea.  - Reinforced the importance of lifestyle modifications such as low sodium diet, regular exercise, and stress reduction for blood pressure management.  - Patient to continue current exercise regimen.  - Patient to maintain reduced alcohol intake.    FOLLOW-UP:  - Scheduled follow up in 2-3 weeks to assess home BP readings and evaluate if medication adjustments are needed.            Scot Sanchez MD  4/7/2025    This note was prepared using voice-recognition software.  Although efforts are made to proofread the note, some errors may persist in the final document.         [1]   Patient Active Problem List  Diagnosis    HTN (hypertension)   [2]   Current Outpatient Medications:     fluocinolone acetonide oiL 0.01 % Drop, Place 1 drop in ear(s) daily as needed., Disp: 20 mL, Rfl: 0    hydroCHLOROthiazide (HYDRODIURIL) 50 MG tablet, Take 1 tablet (50 mg total) by mouth once daily., Disp: 90 tablet, Rfl: 1    milk thistle 175 mg tablet, Take 175 mg by mouth once daily., Disp: , Rfl:     multivitamin capsule, Take 1 capsule by mouth once daily., Disp: , Rfl:     hydroCHLOROthiazide (HYDRODIURIL) 25 MG tablet, Take 1 tablet (25 mg total) by mouth once daily., Disp: 30 tablet, Rfl: 5    valsartan (DIOVAN) 80 MG tablet, Take 1 tablet  (80 mg total) by mouth once daily., Disp: 30 tablet, Rfl: 5

## 2025-04-17 ENCOUNTER — TELEPHONE (OUTPATIENT)
Dept: PRIMARY CARE CLINIC | Facility: CLINIC | Age: 55
End: 2025-04-17
Payer: COMMERCIAL

## 2025-04-17 VITALS — DIASTOLIC BLOOD PRESSURE: 82 MMHG | SYSTOLIC BLOOD PRESSURE: 129 MMHG

## 2025-04-17 RX ORDER — VALSARTAN 160 MG/1
160 TABLET ORAL DAILY
Qty: 90 TABLET | Refills: 3 | Status: SHIPPED | OUTPATIENT
Start: 2025-04-17 | End: 2026-04-17

## 2025-04-17 NOTE — TELEPHONE ENCOUNTER
----- Message from Scot Sanchez MD sent at 3/20/2025  2:11 PM CDT -----  Regarding: Repeat BP reading  Can we reach out to patient to get updated home bp readings?ThanksJM

## 2025-04-21 ENCOUNTER — TELEPHONE (OUTPATIENT)
Dept: PRIMARY CARE CLINIC | Facility: CLINIC | Age: 55
End: 2025-04-21
Payer: COMMERCIAL

## 2025-04-21 VITALS — DIASTOLIC BLOOD PRESSURE: 76 MMHG | SYSTOLIC BLOOD PRESSURE: 124 MMHG

## 2025-04-21 NOTE — TELEPHONE ENCOUNTER
----- Message from Scot Sanchez MD sent at 4/7/2025 12:35 PM CDT -----  Regarding: Repeat BP reading  Can we reach out to patient to get updated home bp readings?ThanksJM

## 2025-05-15 ENCOUNTER — PATIENT MESSAGE (OUTPATIENT)
Dept: PRIMARY CARE CLINIC | Facility: CLINIC | Age: 55
End: 2025-05-15
Payer: COMMERCIAL

## 2025-06-19 DIAGNOSIS — I10 HYPERTENSION, UNSPECIFIED TYPE: Primary | ICD-10-CM

## 2025-06-19 RX ORDER — HYDROCHLOROTHIAZIDE 25 MG/1
25 TABLET ORAL DAILY
Qty: 30 TABLET | Refills: 5 | Status: SHIPPED | OUTPATIENT
Start: 2025-06-19 | End: 2026-06-19

## 2025-06-19 NOTE — TELEPHONE ENCOUNTER
Care Due:                  Date            Visit Type   Department     Provider  --------------------------------------------------------------------------------                                MYCHART                              FOLLOWUP/OF  [facility]  Last Visit: 04-      [facility] VISIT   EVERT Sanchez  Next Visit: None Scheduled  None         None Found                                                            Last  Test          Frequency    Reason                     Performed    Due Date  --------------------------------------------------------------------------------    CMP.........  12 months..  hydroCHLOROthiazide,       06- 06-                             valsartan................    [employer] Embedded Care Due Messages. Reference number: [id number].   6/19/2025 8:16:17 AM CDT

## 2025-06-25 DIAGNOSIS — I10 HTN (HYPERTENSION): ICD-10-CM

## 2025-06-26 ENCOUNTER — OFFICE VISIT (OUTPATIENT)
Dept: OPTOMETRY | Facility: CLINIC | Age: 55
End: 2025-06-26
Payer: COMMERCIAL

## 2025-06-26 DIAGNOSIS — H25.813 COMBINED FORMS OF AGE-RELATED CATARACT, BILATERAL: Primary | ICD-10-CM

## 2025-06-26 DIAGNOSIS — H52.03 HYPEROPIA WITH ASTIGMATISM AND PRESBYOPIA, BILATERAL: ICD-10-CM

## 2025-06-26 DIAGNOSIS — H52.203 HYPEROPIA WITH ASTIGMATISM AND PRESBYOPIA, BILATERAL: ICD-10-CM

## 2025-06-26 DIAGNOSIS — Z98.890 HISTORY OF LASER REFRACTIVE SURGERY: ICD-10-CM

## 2025-06-26 DIAGNOSIS — H52.4 HYPEROPIA WITH ASTIGMATISM AND PRESBYOPIA, BILATERAL: ICD-10-CM

## 2025-06-26 PROCEDURE — 4010F ACE/ARB THERAPY RXD/TAKEN: CPT | Mod: CPTII,S$GLB,,

## 2025-06-26 PROCEDURE — 92004 COMPRE OPH EXAM NEW PT 1/>: CPT | Mod: S$GLB,,,

## 2025-06-26 PROCEDURE — 99999 PR PBB SHADOW E&M-EST. PATIENT-LVL III: CPT | Mod: PBBFAC,,,

## 2025-06-26 PROCEDURE — 1159F MED LIST DOCD IN RCRD: CPT | Mod: CPTII,S$GLB,,

## 2025-06-26 NOTE — PROGRESS NOTES
HPI    New pt here for annual eye exam. Last exam- 2 yrs  H/o Lasik  2005 in [place]. Prev CL wearer.     Pt sts VA OU not as clear as it used to be, does not want to wear   specs/CL. Denies flashes/floaters/pain. Pt denies use of gtt.   Last edited by Raina Taylor on 6/26/2025  9:05 AM.            Assessment /Plan     For exam results, see Encounter Report.    Combined forms of age-related cataract, bilateral  -     Ambulatory referral/consult to Ophthalmology; Future; Expected date: 07/03/2025    History of laser refractive surgery    Hyperopia with astigmatism and presbyopia, bilateral      1-3. Pt had LASIK done ~20 years ago. Reports that it's held up well overall, but that he's starting to notice distance blur and difficulties with focus at computer/near. Pt would like to discuss options for correcting vision without dependence of specs or contact lenses. Ocular health WNL OD, OS. However, mild nuclear sclerosis OU with cortical cataract OS. BCVA 20/20. Discussed all findings with pt and placed referral placed to Dr. Best for further evaluation / to discuss repeat LASIK vs cataract surgery.     RTC: 1 year for comprehensive exam or sooner prn

## 2025-07-14 ENCOUNTER — OFFICE VISIT (OUTPATIENT)
Dept: PRIMARY CARE CLINIC | Facility: CLINIC | Age: 55
End: 2025-07-14
Payer: COMMERCIAL

## 2025-07-14 VITALS
WEIGHT: 199.31 LBS | HEIGHT: 73 IN | HEART RATE: 98 BPM | SYSTOLIC BLOOD PRESSURE: 126 MMHG | OXYGEN SATURATION: 99 % | BODY MASS INDEX: 26.42 KG/M2 | DIASTOLIC BLOOD PRESSURE: 68 MMHG

## 2025-07-14 DIAGNOSIS — F10.10 ALCOHOL ABUSE: ICD-10-CM

## 2025-07-14 DIAGNOSIS — Z00.00 ANNUAL PHYSICAL EXAM: Primary | ICD-10-CM

## 2025-07-14 DIAGNOSIS — I10 HYPERTENSION, UNSPECIFIED TYPE: ICD-10-CM

## 2025-07-14 DIAGNOSIS — Z12.5 SCREENING PSA (PROSTATE SPECIFIC ANTIGEN): ICD-10-CM

## 2025-07-14 DIAGNOSIS — Z51.81 ENCOUNTER FOR MEDICATION MONITORING: ICD-10-CM

## 2025-07-14 DIAGNOSIS — Z13.6 ENCOUNTER FOR LIPID SCREENING FOR CARDIOVASCULAR DISEASE: ICD-10-CM

## 2025-07-14 DIAGNOSIS — Z13.220 ENCOUNTER FOR LIPID SCREENING FOR CARDIOVASCULAR DISEASE: ICD-10-CM

## 2025-07-14 DIAGNOSIS — Z11.3 SCREEN FOR STD (SEXUALLY TRANSMITTED DISEASE): ICD-10-CM

## 2025-07-14 PROCEDURE — 1160F RVW MEDS BY RX/DR IN RCRD: CPT | Mod: CPTII,S$GLB,, | Performed by: FAMILY MEDICINE

## 2025-07-14 PROCEDURE — 99999 PR PBB SHADOW E&M-EST. PATIENT-LVL IV: CPT | Mod: PBBFAC,,, | Performed by: FAMILY MEDICINE

## 2025-07-14 PROCEDURE — 4010F ACE/ARB THERAPY RXD/TAKEN: CPT | Mod: CPTII,S$GLB,, | Performed by: FAMILY MEDICINE

## 2025-07-14 PROCEDURE — 3008F BODY MASS INDEX DOCD: CPT | Mod: CPTII,S$GLB,, | Performed by: FAMILY MEDICINE

## 2025-07-14 PROCEDURE — 1159F MED LIST DOCD IN RCRD: CPT | Mod: CPTII,S$GLB,, | Performed by: FAMILY MEDICINE

## 2025-07-14 PROCEDURE — 3074F SYST BP LT 130 MM HG: CPT | Mod: CPTII,S$GLB,, | Performed by: FAMILY MEDICINE

## 2025-07-14 PROCEDURE — 3078F DIAST BP <80 MM HG: CPT | Mod: CPTII,S$GLB,, | Performed by: FAMILY MEDICINE

## 2025-07-14 PROCEDURE — 3044F HG A1C LEVEL LT 7.0%: CPT | Mod: CPTII,S$GLB,, | Performed by: FAMILY MEDICINE

## 2025-07-14 PROCEDURE — 99396 PREV VISIT EST AGE 40-64: CPT | Mod: S$GLB,,, | Performed by: FAMILY MEDICINE

## 2025-07-14 RX ORDER — VALSARTAN 160 MG/1
160 TABLET ORAL DAILY
Qty: 90 TABLET | Refills: 3 | Status: SHIPPED | OUTPATIENT
Start: 2025-07-14 | End: 2026-07-14

## 2025-07-14 NOTE — PROGRESS NOTES
"Subjective:       Patient ID: Marlon Farr is a 55 y.o. male.    Chief Complaint: Annual Exam (Pt here for annual exam)    HPI   55 y/o male with BPPV, HTN is here for annual exam.    He is interested in trying to take Naltrexone to decrease alcohol intake, he is drinking 2-3 tequila cocktails every evening and some days more. He has tried quitting cold turkey that did not help, he has also been to counseling which did not really help. He has been trying to eat healthy over the past few months, he is exercising 3 days a week does cardio and weights. He denies f/n/v/d/constipation/cp/sob/urinary sx. He is sleeping well, wakes up rested. Mood good.    Home bp 1-2 days a week, 120-130/70-80's     HTN: HCTZ 25 mg daily, Valsartan 160 mg daily  Colonoscopy 9/2023 repeat 10 years  Eye exam utd  Dental utd  OTC: milk thistle    Review of Systems  see HPI  Objective:      /68 (BP Location: Left arm, Patient Position: Sitting)   Pulse 98   Ht 6' 1" (1.854 m)   Wt 90.4 kg (199 lb 4.7 oz)   SpO2 99%   BMI 26.29 kg/m²   Physical Exam  Vitals and nursing note reviewed.   Constitutional:       Appearance: He is well-developed.   HENT:      Head: Normocephalic and atraumatic.      Right Ear: There is impacted cerumen.      Left Ear: There is impacted cerumen.      Mouth/Throat:      Pharynx: No oropharyngeal exudate or posterior oropharyngeal erythema.   Neck:      Thyroid: No thyromegaly.   Cardiovascular:      Rate and Rhythm: Normal rate and regular rhythm.      Heart sounds: Normal heart sounds.   Pulmonary:      Effort: Pulmonary effort is normal. No respiratory distress.      Breath sounds: Normal breath sounds.   Abdominal:      General: Bowel sounds are normal. There is no distension.      Palpations: Abdomen is soft. There is no mass.      Tenderness: There is no abdominal tenderness.   Musculoskeletal:      Cervical back: Normal range of motion and neck supple.      Right lower leg: No edema.      Left " lower leg: No edema.   Lymphadenopathy:      Cervical: No cervical adenopathy.   Skin:     General: Skin is warm and dry.   Neurological:      Mental Status: He is alert.         Assessment:       1. Annual physical exam    2. Alcohol abuse    3. Encounter for lipid screening for cardiovascular disease    4. Screening PSA (prostate specific antigen)    5. Screen for STD (sexually transmitted disease)    6. Hypertension, unspecified type    7. Encounter for medication monitoring        Plan:   Marlon was seen today for annual exam.    Diagnoses and all orders for this visit:    Annual physical exam  -     CBC Auto Differential; Future  -     Comprehensive Metabolic Panel; Future  -     Hemoglobin A1C; Future  -     Lipid Panel; Future  -     TSH; Future  -     PSA, Screening; Future  -     Vitamin B12; Future  -     Hepatitis Panel, Acute; Future  -     HIV 1/2 Ag/Ab (4th Gen); Future  -     C. trachomatis/N. gonorrhoeae by AMP DNA; Future  -     Treponema Pallidium Antibodies IgG, IgM; Future    Alcohol abuse  -     Comprehensive Metabolic Panel; Future  -     Vitamin B12; Future  -     Comprehensive Metabolic Panel; Future  -     naltrexone (DEPADE) 50 mg tablet; Take 1 tablet (50 mg total) by mouth once daily.    Encounter for lipid screening for cardiovascular disease  -     Lipid Panel; Future    Screening PSA (prostate specific antigen)  -     PSA, Screening; Future    Screen for STD (sexually transmitted disease)  -     Hepatitis Panel, Acute; Future  -     HIV 1/2 Ag/Ab (4th Gen); Future  -     C. trachomatis/N. gonorrhoeae by AMP DNA; Future  -     Treponema Pallidium Antibodies IgG, IgM; Future    Hypertension, unspecified type  -     valsartan (DIOVAN) 160 MG tablet; Take 1 tablet (160 mg total) by mouth once daily.  -     CBC Auto Differential; Future  -     Comprehensive Metabolic Panel; Future  -     Hemoglobin A1C; Future  -     Lipid Panel; Future    Encounter for medication monitoring  -      Comprehensive Metabolic Panel; Future

## 2025-07-15 ENCOUNTER — LAB VISIT (OUTPATIENT)
Dept: LAB | Facility: HOSPITAL | Age: 55
End: 2025-07-15
Attending: FAMILY MEDICINE
Payer: COMMERCIAL

## 2025-07-15 ENCOUNTER — RESULTS FOLLOW-UP (OUTPATIENT)
Dept: PRIMARY CARE CLINIC | Facility: CLINIC | Age: 55
End: 2025-07-15
Payer: COMMERCIAL

## 2025-07-15 DIAGNOSIS — I10 HYPERTENSION, UNSPECIFIED TYPE: ICD-10-CM

## 2025-07-15 DIAGNOSIS — Z13.220 ENCOUNTER FOR LIPID SCREENING FOR CARDIOVASCULAR DISEASE: ICD-10-CM

## 2025-07-15 DIAGNOSIS — Z11.3 SCREEN FOR STD (SEXUALLY TRANSMITTED DISEASE): ICD-10-CM

## 2025-07-15 DIAGNOSIS — Z51.81 ENCOUNTER FOR MEDICATION MONITORING: ICD-10-CM

## 2025-07-15 DIAGNOSIS — E87.5 HYPERKALEMIA: Primary | ICD-10-CM

## 2025-07-15 DIAGNOSIS — Z00.00 ANNUAL PHYSICAL EXAM: ICD-10-CM

## 2025-07-15 DIAGNOSIS — Z12.5 SCREENING PSA (PROSTATE SPECIFIC ANTIGEN): ICD-10-CM

## 2025-07-15 DIAGNOSIS — Z13.6 ENCOUNTER FOR LIPID SCREENING FOR CARDIOVASCULAR DISEASE: ICD-10-CM

## 2025-07-15 DIAGNOSIS — F10.10 ALCOHOL ABUSE: ICD-10-CM

## 2025-07-15 LAB
ABSOLUTE EOSINOPHIL (OHS): 0.24 K/UL
ABSOLUTE MONOCYTE (OHS): 0.54 K/UL (ref 0.3–1)
ABSOLUTE NEUTROPHIL COUNT (OHS): 2.3 K/UL (ref 1.8–7.7)
ALBUMIN SERPL BCP-MCNC: 4.4 G/DL (ref 3.5–5.2)
ALP SERPL-CCNC: 62 UNIT/L (ref 40–150)
ALT SERPL W/O P-5'-P-CCNC: 37 UNIT/L (ref 10–44)
ANION GAP (OHS): 10 MMOL/L (ref 8–16)
AST SERPL-CCNC: 28 UNIT/L (ref 11–45)
BASOPHILS # BLD AUTO: 0.05 K/UL
BASOPHILS NFR BLD AUTO: 1.1 %
BILIRUB SERPL-MCNC: 0.6 MG/DL (ref 0.1–1)
BUN SERPL-MCNC: 19 MG/DL (ref 6–20)
CALCIUM SERPL-MCNC: 9.9 MG/DL (ref 8.7–10.5)
CHLORIDE SERPL-SCNC: 102 MMOL/L (ref 95–110)
CHOLEST SERPL-MCNC: 263 MG/DL (ref 120–199)
CHOLEST/HDLC SERPL: 4.5 {RATIO} (ref 2–5)
CO2 SERPL-SCNC: 26 MMOL/L (ref 23–29)
CREAT SERPL-MCNC: 1 MG/DL (ref 0.5–1.4)
EAG (OHS): 105 MG/DL (ref 68–131)
ERYTHROCYTE [DISTWIDTH] IN BLOOD BY AUTOMATED COUNT: 12.1 % (ref 11.5–14.5)
GFR SERPLBLD CREATININE-BSD FMLA CKD-EPI: >60 ML/MIN/1.73/M2
GLUCOSE SERPL-MCNC: 112 MG/DL (ref 70–110)
HAV IGM SERPL QL IA: NORMAL
HBA1C MFR BLD: 5.3 % (ref 4–5.6)
HBV CORE IGM SERPL QL IA: NORMAL
HBV SURFACE AG SERPL QL IA: NORMAL
HCT VFR BLD AUTO: 45.6 % (ref 40–54)
HCV AB SERPL QL IA: NORMAL
HDLC SERPL-MCNC: 59 MG/DL (ref 40–75)
HDLC SERPL: 22.4 % (ref 20–50)
HGB BLD-MCNC: 15.3 GM/DL (ref 14–18)
HIV 1+2 AB+HIV1 P24 AG SERPL QL IA: NORMAL
IMM GRANULOCYTES # BLD AUTO: 0.01 K/UL (ref 0–0.04)
IMM GRANULOCYTES NFR BLD AUTO: 0.2 % (ref 0–0.5)
LDLC SERPL CALC-MCNC: 178.6 MG/DL (ref 63–159)
LYMPHOCYTES # BLD AUTO: 1.49 K/UL (ref 1–4.8)
MCH RBC QN AUTO: 31.2 PG (ref 27–31)
MCHC RBC AUTO-ENTMCNC: 33.6 G/DL (ref 32–36)
MCV RBC AUTO: 93 FL (ref 82–98)
NONHDLC SERPL-MCNC: 204 MG/DL
NUCLEATED RBC (/100WBC) (OHS): 0 /100 WBC
PLATELET # BLD AUTO: 240 K/UL (ref 150–450)
PMV BLD AUTO: 9.1 FL (ref 9.2–12.9)
POTASSIUM SERPL-SCNC: 5.7 MMOL/L (ref 3.5–5.1)
PROT SERPL-MCNC: 7.7 GM/DL (ref 6–8.4)
PSA SERPL-MCNC: 1.04 NG/ML
RBC # BLD AUTO: 4.9 M/UL (ref 4.6–6.2)
RELATIVE EOSINOPHIL (OHS): 5.2 %
RELATIVE LYMPHOCYTE (OHS): 32.2 % (ref 18–48)
RELATIVE MONOCYTE (OHS): 11.7 % (ref 4–15)
RELATIVE NEUTROPHIL (OHS): 49.6 % (ref 38–73)
SODIUM SERPL-SCNC: 138 MMOL/L (ref 136–145)
T PALLIDUM IGG+IGM SER QL: NORMAL
TRIGL SERPL-MCNC: 127 MG/DL (ref 30–150)
TSH SERPL-ACNC: 3.75 UIU/ML (ref 0.4–4)
VIT B12 SERPL-MCNC: 574 PG/ML (ref 210–950)
WBC # BLD AUTO: 4.63 K/UL (ref 3.9–12.7)

## 2025-07-15 PROCEDURE — 86593 SYPHILIS TEST NON-TREP QUANT: CPT

## 2025-07-15 PROCEDURE — 80074 ACUTE HEPATITIS PANEL: CPT

## 2025-07-15 PROCEDURE — 87591 N.GONORRHOEAE DNA AMP PROB: CPT

## 2025-07-15 PROCEDURE — 87389 HIV-1 AG W/HIV-1&-2 AB AG IA: CPT

## 2025-07-15 PROCEDURE — 84153 ASSAY OF PSA TOTAL: CPT

## 2025-07-15 PROCEDURE — 36415 COLL VENOUS BLD VENIPUNCTURE: CPT | Mod: PN

## 2025-07-15 PROCEDURE — 82607 VITAMIN B-12: CPT

## 2025-07-15 PROCEDURE — 83036 HEMOGLOBIN GLYCOSYLATED A1C: CPT

## 2025-07-15 PROCEDURE — 84443 ASSAY THYROID STIM HORMONE: CPT

## 2025-07-15 PROCEDURE — 85025 COMPLETE CBC W/AUTO DIFF WBC: CPT

## 2025-07-15 PROCEDURE — 82040 ASSAY OF SERUM ALBUMIN: CPT

## 2025-07-15 PROCEDURE — 82465 ASSAY BLD/SERUM CHOLESTEROL: CPT

## 2025-07-16 ENCOUNTER — TELEPHONE (OUTPATIENT)
Dept: PRIMARY CARE CLINIC | Facility: CLINIC | Age: 55
End: 2025-07-16
Payer: COMMERCIAL

## 2025-07-16 PROBLEM — F10.10 ALCOHOL ABUSE: Status: ACTIVE | Noted: 2025-07-16

## 2025-07-16 RX ORDER — NALTREXONE HYDROCHLORIDE 50 MG/1
50 TABLET, FILM COATED ORAL DAILY
Qty: 30 TABLET | Refills: 3 | Status: SHIPPED | OUTPATIENT
Start: 2025-07-16

## 2025-07-16 NOTE — TELEPHONE ENCOUNTER
Spoke with pt and advised that Dr.Rebecca Alfredo MD sent in naltrexone (DEPADE) 50 mg tablet to  [facility]/pharmacy - [place], [place] - [address].    Pt expressed understanding + all questions were answered.

## 2025-07-21 LAB
C TRACH DNA SPEC QL NAA+PROBE: NOT DETECTED
CTGC SOURCE (OHS) ORD-325: NORMAL
N GONORRHOEA DNA UR QL NAA+PROBE: NOT DETECTED

## 2025-07-28 ENCOUNTER — LAB VISIT (OUTPATIENT)
Dept: LAB | Facility: HOSPITAL | Age: 55
End: 2025-07-28
Attending: FAMILY MEDICINE
Payer: COMMERCIAL

## 2025-07-28 DIAGNOSIS — Z00.00 ANNUAL PHYSICAL EXAM: ICD-10-CM

## 2025-07-28 DIAGNOSIS — I10 HYPERTENSION, UNSPECIFIED TYPE: ICD-10-CM

## 2025-07-28 DIAGNOSIS — F10.10 ALCOHOL ABUSE: ICD-10-CM

## 2025-07-28 LAB
ALBUMIN SERPL BCP-MCNC: 4.4 G/DL (ref 3.5–5.2)
ALP SERPL-CCNC: 61 UNIT/L (ref 40–150)
ALT SERPL W/O P-5'-P-CCNC: 41 UNIT/L (ref 0–55)
ANION GAP (OHS): 10 MMOL/L (ref 8–16)
AST SERPL-CCNC: 34 UNIT/L (ref 0–50)
BILIRUB SERPL-MCNC: 0.7 MG/DL (ref 0.1–1)
BUN SERPL-MCNC: 19 MG/DL (ref 6–20)
CALCIUM SERPL-MCNC: 9.8 MG/DL (ref 8.7–10.5)
CHLORIDE SERPL-SCNC: 101 MMOL/L (ref 95–110)
CO2 SERPL-SCNC: 27 MMOL/L (ref 23–29)
CREAT SERPL-MCNC: 1 MG/DL (ref 0.5–1.4)
GFR SERPLBLD CREATININE-BSD FMLA CKD-EPI: >60 ML/MIN/1.73/M2
GLUCOSE SERPL-MCNC: 106 MG/DL (ref 70–110)
POTASSIUM SERPL-SCNC: 4.1 MMOL/L (ref 3.5–5.1)
PROT SERPL-MCNC: 7.6 GM/DL (ref 6–8.4)
SODIUM SERPL-SCNC: 138 MMOL/L (ref 136–145)

## 2025-07-28 PROCEDURE — 36415 COLL VENOUS BLD VENIPUNCTURE: CPT | Mod: PN

## 2025-07-28 PROCEDURE — 84075 ASSAY ALKALINE PHOSPHATASE: CPT

## 2025-08-12 ENCOUNTER — OFFICE VISIT (OUTPATIENT)
Dept: PRIMARY CARE CLINIC | Facility: CLINIC | Age: 55
End: 2025-08-12
Payer: COMMERCIAL

## 2025-08-12 ENCOUNTER — PATIENT OUTREACH (OUTPATIENT)
Dept: ADMINISTRATIVE | Facility: HOSPITAL | Age: 55
End: 2025-08-12
Payer: COMMERCIAL

## 2025-08-12 DIAGNOSIS — E78.5 DYSLIPIDEMIA: ICD-10-CM

## 2025-08-12 DIAGNOSIS — I10 HYPERTENSION, UNSPECIFIED TYPE: ICD-10-CM

## 2025-08-12 DIAGNOSIS — F10.10 ALCOHOL ABUSE: Primary | ICD-10-CM

## 2025-08-12 PROCEDURE — 98006 SYNCH AUDIO-VIDEO EST MOD 30: CPT | Mod: 95,,, | Performed by: INTERNAL MEDICINE

## 2025-08-12 PROCEDURE — 3044F HG A1C LEVEL LT 7.0%: CPT | Mod: CPTII,95,, | Performed by: INTERNAL MEDICINE

## 2025-08-12 PROCEDURE — 4010F ACE/ARB THERAPY RXD/TAKEN: CPT | Mod: CPTII,95,, | Performed by: INTERNAL MEDICINE

## 2025-08-29 ENCOUNTER — PATIENT MESSAGE (OUTPATIENT)
Dept: PRIMARY CARE CLINIC | Facility: CLINIC | Age: 55
End: 2025-08-29
Payer: COMMERCIAL